# Patient Record
Sex: MALE | Race: WHITE | NOT HISPANIC OR LATINO | Employment: OTHER | URBAN - METROPOLITAN AREA
[De-identification: names, ages, dates, MRNs, and addresses within clinical notes are randomized per-mention and may not be internally consistent; named-entity substitution may affect disease eponyms.]

---

## 2024-09-05 ENCOUNTER — APPOINTMENT (OUTPATIENT)
Dept: RADIOLOGY | Facility: HOSPITAL | Age: 49
End: 2024-09-05
Payer: MEDICAID

## 2024-09-05 ENCOUNTER — APPOINTMENT (OUTPATIENT)
Dept: CARDIOLOGY | Facility: HOSPITAL | Age: 49
End: 2024-09-05
Payer: MEDICAID

## 2024-09-05 ENCOUNTER — HOSPITAL ENCOUNTER (OUTPATIENT)
Facility: HOSPITAL | Age: 49
Setting detail: OBSERVATION
Discharge: HOME | End: 2024-09-06
Attending: EMERGENCY MEDICINE | Admitting: STUDENT IN AN ORGANIZED HEALTH CARE EDUCATION/TRAINING PROGRAM
Payer: MEDICAID

## 2024-09-05 DIAGNOSIS — K21.00 GASTROESOPHAGEAL REFLUX DISEASE WITH ESOPHAGITIS, UNSPECIFIED WHETHER HEMORRHAGE: ICD-10-CM

## 2024-09-05 DIAGNOSIS — E78.49 OTHER HYPERLIPIDEMIA: ICD-10-CM

## 2024-09-05 DIAGNOSIS — F32.A DEPRESSION, UNSPECIFIED DEPRESSION TYPE: ICD-10-CM

## 2024-09-05 DIAGNOSIS — I15.9 SECONDARY HYPERTENSION: ICD-10-CM

## 2024-09-05 DIAGNOSIS — R07.9 CHEST PAIN, UNSPECIFIED TYPE: Primary | ICD-10-CM

## 2024-09-05 LAB
ALBUMIN SERPL BCP-MCNC: 5.1 G/DL (ref 3.4–5)
ALP SERPL-CCNC: 82 U/L (ref 33–120)
ALT SERPL W P-5'-P-CCNC: 12 U/L (ref 10–52)
AMPHETAMINES UR QL SCN: ABNORMAL
ANION GAP SERPL CALC-SCNC: 19 MMOL/L (ref 10–20)
AORTIC VALVE PEAK VELOCITY: 1.28 M/S
AST SERPL W P-5'-P-CCNC: 14 U/L (ref 9–39)
AV PEAK GRADIENT: 6.5 MMHG
AVA (PEAK VEL): 2.42 CM2
BARBITURATES UR QL SCN: ABNORMAL
BASOPHILS # BLD AUTO: 0.06 X10*3/UL (ref 0–0.1)
BASOPHILS NFR BLD AUTO: 0.6 %
BENZODIAZ UR QL SCN: ABNORMAL
BILIRUB SERPL-MCNC: 0.9 MG/DL (ref 0–1.2)
BNP SERPL-MCNC: 549 PG/ML (ref 0–99)
BUN SERPL-MCNC: 35 MG/DL (ref 6–23)
BZE UR QL SCN: ABNORMAL
CALCIUM SERPL-MCNC: 9.9 MG/DL (ref 8.6–10.6)
CANNABINOIDS UR QL SCN: ABNORMAL
CARDIAC TROPONIN I PNL SERPL HS: 40 NG/L (ref 0–53)
CARDIAC TROPONIN I PNL SERPL HS: 42 NG/L (ref 0–53)
CARDIAC TROPONIN I PNL SERPL HS: 45 NG/L (ref 0–53)
CHLORIDE SERPL-SCNC: 109 MMOL/L (ref 98–107)
CO2 SERPL-SCNC: 18 MMOL/L (ref 21–32)
CREAT SERPL-MCNC: 2.55 MG/DL (ref 0.5–1.3)
EGFRCR SERPLBLD CKD-EPI 2021: 30 ML/MIN/1.73M*2
EJECTION FRACTION APICAL 4 CHAMBER: 41.4
EJECTION FRACTION: 38 %
EOSINOPHIL # BLD AUTO: 0.01 X10*3/UL (ref 0–0.7)
EOSINOPHIL NFR BLD AUTO: 0.1 %
ERYTHROCYTE [DISTWIDTH] IN BLOOD BY AUTOMATED COUNT: 13 % (ref 11.5–14.5)
FENTANYL+NORFENTANYL UR QL SCN: ABNORMAL
GLUCOSE BLD MANUAL STRIP-MCNC: 83 MG/DL (ref 74–99)
GLUCOSE SERPL-MCNC: 83 MG/DL (ref 74–99)
HCT VFR BLD AUTO: 39.2 % (ref 41–52)
HGB BLD-MCNC: 13.3 G/DL (ref 13.5–17.5)
IMM GRANULOCYTES # BLD AUTO: 0.04 X10*3/UL (ref 0–0.7)
IMM GRANULOCYTES NFR BLD AUTO: 0.4 % (ref 0–0.9)
LEFT ATRIUM VOLUME AREA LENGTH INDEX BSA: 19.8 ML/M2
LEFT VENTRICLE INTERNAL DIMENSION DIASTOLE: 5.14 CM (ref 3.5–6)
LEFT VENTRICULAR OUTFLOW TRACT DIAMETER: 1.94 CM
LYMPHOCYTES # BLD AUTO: 1.55 X10*3/UL (ref 1.2–4.8)
LYMPHOCYTES NFR BLD AUTO: 14.3 %
MAGNESIUM SERPL-MCNC: 2.3 MG/DL (ref 1.6–2.4)
MCH RBC QN AUTO: 31.8 PG (ref 26–34)
MCHC RBC AUTO-ENTMCNC: 33.9 G/DL (ref 32–36)
MCV RBC AUTO: 94 FL (ref 80–100)
METHADONE UR QL SCN: ABNORMAL
MITRAL VALVE E/A RATIO: 0.61
MONOCYTES # BLD AUTO: 1.03 X10*3/UL (ref 0.1–1)
MONOCYTES NFR BLD AUTO: 9.5 %
NEUTROPHILS # BLD AUTO: 8.15 X10*3/UL (ref 1.2–7.7)
NEUTROPHILS NFR BLD AUTO: 75.1 %
NRBC BLD-RTO: 0 /100 WBCS (ref 0–0)
OPIATES UR QL SCN: ABNORMAL
OXYCODONE+OXYMORPHONE UR QL SCN: ABNORMAL
PCP UR QL SCN: ABNORMAL
PLATELET # BLD AUTO: 170 X10*3/UL (ref 150–450)
POTASSIUM SERPL-SCNC: 3.7 MMOL/L (ref 3.5–5.3)
PROT SERPL-MCNC: 8.6 G/DL (ref 6.4–8.2)
RBC # BLD AUTO: 4.18 X10*6/UL (ref 4.5–5.9)
RIGHT VENTRICLE FREE WALL PEAK S': 11 CM/S
SODIUM SERPL-SCNC: 142 MMOL/L (ref 136–145)
TRICUSPID ANNULAR PLANE SYSTOLIC EXCURSION: 2 CM
WBC # BLD AUTO: 10.8 X10*3/UL (ref 4.4–11.3)

## 2024-09-05 PROCEDURE — 96374 THER/PROPH/DIAG INJ IV PUSH: CPT

## 2024-09-05 PROCEDURE — 84484 ASSAY OF TROPONIN QUANT: CPT

## 2024-09-05 PROCEDURE — 93325 DOPPLER ECHO COLOR FLOW MAPG: CPT

## 2024-09-05 PROCEDURE — 93308 TTE F-UP OR LMTD: CPT | Performed by: STUDENT IN AN ORGANIZED HEALTH CARE EDUCATION/TRAINING PROGRAM

## 2024-09-05 PROCEDURE — 93321 DOPPLER ECHO F-UP/LMTD STD: CPT | Performed by: STUDENT IN AN ORGANIZED HEALTH CARE EDUCATION/TRAINING PROGRAM

## 2024-09-05 PROCEDURE — 83880 ASSAY OF NATRIURETIC PEPTIDE: CPT

## 2024-09-05 PROCEDURE — 2500000004 HC RX 250 GENERAL PHARMACY W/ HCPCS (ALT 636 FOR OP/ED)

## 2024-09-05 PROCEDURE — 1200000002 HC GENERAL ROOM WITH TELEMETRY DAILY

## 2024-09-05 PROCEDURE — 71046 X-RAY EXAM CHEST 2 VIEWS: CPT

## 2024-09-05 PROCEDURE — 96361 HYDRATE IV INFUSION ADD-ON: CPT

## 2024-09-05 PROCEDURE — 93010 ELECTROCARDIOGRAM REPORT: CPT | Performed by: EMERGENCY MEDICINE

## 2024-09-05 PROCEDURE — 96376 TX/PRO/DX INJ SAME DRUG ADON: CPT

## 2024-09-05 PROCEDURE — 2500000001 HC RX 250 WO HCPCS SELF ADMINISTERED DRUGS (ALT 637 FOR MEDICARE OP)

## 2024-09-05 PROCEDURE — 80053 COMPREHEN METABOLIC PANEL: CPT

## 2024-09-05 PROCEDURE — 99285 EMERGENCY DEPT VISIT HI MDM: CPT | Performed by: EMERGENCY MEDICINE

## 2024-09-05 PROCEDURE — 36415 COLL VENOUS BLD VENIPUNCTURE: CPT

## 2024-09-05 PROCEDURE — 93325 DOPPLER ECHO COLOR FLOW MAPG: CPT | Performed by: STUDENT IN AN ORGANIZED HEALTH CARE EDUCATION/TRAINING PROGRAM

## 2024-09-05 PROCEDURE — 2500000002 HC RX 250 W HCPCS SELF ADMINISTERED DRUGS (ALT 637 FOR MEDICARE OP, ALT 636 FOR OP/ED)

## 2024-09-05 PROCEDURE — 71046 X-RAY EXAM CHEST 2 VIEWS: CPT | Performed by: STUDENT IN AN ORGANIZED HEALTH CARE EDUCATION/TRAINING PROGRAM

## 2024-09-05 PROCEDURE — 82947 ASSAY GLUCOSE BLOOD QUANT: CPT

## 2024-09-05 PROCEDURE — 85025 COMPLETE CBC W/AUTO DIFF WBC: CPT

## 2024-09-05 PROCEDURE — 99223 1ST HOSP IP/OBS HIGH 75: CPT

## 2024-09-05 PROCEDURE — 2500000004 HC RX 250 GENERAL PHARMACY W/ HCPCS (ALT 636 FOR OP/ED): Performed by: STUDENT IN AN ORGANIZED HEALTH CARE EDUCATION/TRAINING PROGRAM

## 2024-09-05 PROCEDURE — G0378 HOSPITAL OBSERVATION PER HR: HCPCS

## 2024-09-05 PROCEDURE — 96375 TX/PRO/DX INJ NEW DRUG ADDON: CPT

## 2024-09-05 PROCEDURE — 80307 DRUG TEST PRSMV CHEM ANLYZR: CPT

## 2024-09-05 PROCEDURE — 99285 EMERGENCY DEPT VISIT HI MDM: CPT | Mod: 25

## 2024-09-05 PROCEDURE — 83735 ASSAY OF MAGNESIUM: CPT

## 2024-09-05 PROCEDURE — 93005 ELECTROCARDIOGRAM TRACING: CPT

## 2024-09-05 PROCEDURE — 84075 ASSAY ALKALINE PHOSPHATASE: CPT

## 2024-09-05 RX ORDER — ATORVASTATIN CALCIUM 80 MG/1
80 TABLET, FILM COATED ORAL NIGHTLY
Status: DISCONTINUED | OUTPATIENT
Start: 2024-09-05 | End: 2024-09-06 | Stop reason: HOSPADM

## 2024-09-05 RX ORDER — EZETIMIBE 10 MG/1
10 TABLET ORAL DAILY
Status: ON HOLD | COMMUNITY
End: 2024-09-06

## 2024-09-05 RX ORDER — LISINOPRIL 20 MG/1
10 TABLET ORAL DAILY
Status: ON HOLD | COMMUNITY
End: 2024-09-06

## 2024-09-05 RX ORDER — NITROGLYCERIN 0.4 MG/1
0.4 TABLET SUBLINGUAL ONCE
Status: COMPLETED | OUTPATIENT
Start: 2024-09-05 | End: 2024-09-05

## 2024-09-05 RX ORDER — MORPHINE SULFATE 4 MG/ML
4 INJECTION INTRAVENOUS ONCE
Status: DISCONTINUED | OUTPATIENT
Start: 2024-09-05 | End: 2024-09-05

## 2024-09-05 RX ORDER — PANTOPRAZOLE SODIUM 40 MG/1
40 TABLET, DELAYED RELEASE ORAL DAILY
Status: DISCONTINUED | OUTPATIENT
Start: 2024-09-05 | End: 2024-09-06 | Stop reason: HOSPADM

## 2024-09-05 RX ORDER — ARIPIPRAZOLE 5 MG/1
5 TABLET ORAL
Status: ON HOLD | COMMUNITY
Start: 2022-12-30 | End: 2024-09-06

## 2024-09-05 RX ORDER — HYDROXYZINE HYDROCHLORIDE 25 MG/1
25 TABLET, FILM COATED ORAL EVERY 6 HOURS PRN
Status: DISCONTINUED | OUTPATIENT
Start: 2024-09-05 | End: 2024-09-06 | Stop reason: HOSPADM

## 2024-09-05 RX ORDER — MORPHINE SULFATE 4 MG/ML
4 INJECTION INTRAVENOUS ONCE
Status: COMPLETED | OUTPATIENT
Start: 2024-09-05 | End: 2024-09-05

## 2024-09-05 RX ORDER — CLOPIDOGREL BISULFATE 75 MG/1
75 TABLET ORAL
Status: ON HOLD | COMMUNITY
Start: 2024-07-18 | End: 2024-09-06

## 2024-09-05 RX ORDER — KETOROLAC TROMETHAMINE 15 MG/ML
15 INJECTION, SOLUTION INTRAMUSCULAR; INTRAVENOUS ONCE
Status: COMPLETED | OUTPATIENT
Start: 2024-09-05 | End: 2024-09-05

## 2024-09-05 RX ORDER — CARVEDILOL 12.5 MG/1
1 TABLET ORAL
Status: ON HOLD | COMMUNITY
Start: 2024-07-18 | End: 2024-09-06

## 2024-09-05 RX ORDER — ISOSORBIDE MONONITRATE 120 MG/1
120 TABLET, EXTENDED RELEASE ORAL
Status: ON HOLD | COMMUNITY
Start: 2024-08-09 | End: 2024-09-05 | Stop reason: ENTERED-IN-ERROR

## 2024-09-05 RX ORDER — ARIPIPRAZOLE 5 MG/1
5 TABLET ORAL DAILY
Status: DISCONTINUED | OUTPATIENT
Start: 2024-09-05 | End: 2024-09-06 | Stop reason: HOSPADM

## 2024-09-05 RX ORDER — BUPROPION HYDROCHLORIDE 150 MG/1
300 TABLET ORAL DAILY
Status: DISCONTINUED | OUTPATIENT
Start: 2024-09-05 | End: 2024-09-06 | Stop reason: HOSPADM

## 2024-09-05 RX ORDER — MORPHINE SULFATE 4 MG/ML
1 INJECTION INTRAVENOUS ONCE
Status: COMPLETED | OUTPATIENT
Start: 2024-09-05 | End: 2024-09-05

## 2024-09-05 RX ORDER — POLYETHYLENE GLYCOL 3350 17 G/17G
17 POWDER, FOR SOLUTION ORAL DAILY PRN
Status: DISCONTINUED | OUTPATIENT
Start: 2024-09-05 | End: 2024-09-06 | Stop reason: HOSPADM

## 2024-09-05 RX ORDER — ACETAMINOPHEN 500 MG
1000 TABLET ORAL EVERY 8 HOURS PRN
Status: ON HOLD | COMMUNITY

## 2024-09-05 RX ORDER — ATORVASTATIN CALCIUM 80 MG/1
1 TABLET, FILM COATED ORAL NIGHTLY
Status: ON HOLD | COMMUNITY
Start: 2024-04-18 | End: 2024-09-06

## 2024-09-05 RX ORDER — EZETIMIBE 10 MG/1
10 TABLET ORAL DAILY
Status: DISCONTINUED | OUTPATIENT
Start: 2024-09-05 | End: 2024-09-06 | Stop reason: HOSPADM

## 2024-09-05 RX ORDER — BUPROPION HYDROCHLORIDE 150 MG/1
300 TABLET ORAL DAILY
Status: ON HOLD | COMMUNITY
End: 2024-09-06

## 2024-09-05 RX ORDER — ISOSORBIDE MONONITRATE 30 MG/1
90 TABLET, EXTENDED RELEASE ORAL DAILY
Status: DISCONTINUED | OUTPATIENT
Start: 2024-09-05 | End: 2024-09-06 | Stop reason: HOSPADM

## 2024-09-05 RX ORDER — PANTOPRAZOLE SODIUM 40 MG/1
40 TABLET, DELAYED RELEASE ORAL
Status: ON HOLD | COMMUNITY
Start: 2024-08-08 | End: 2024-09-06

## 2024-09-05 RX ORDER — HYDROXYZINE HYDROCHLORIDE 25 MG/1
1 TABLET, FILM COATED ORAL EVERY 6 HOURS PRN
Status: ON HOLD | COMMUNITY
Start: 2024-08-08

## 2024-09-05 RX ORDER — CARVEDILOL 12.5 MG/1
12.5 TABLET ORAL
Status: DISCONTINUED | OUTPATIENT
Start: 2024-09-05 | End: 2024-09-06 | Stop reason: HOSPADM

## 2024-09-05 RX ORDER — AMLODIPINE BESYLATE 5 MG/1
5 TABLET ORAL DAILY
Status: DISCONTINUED | OUTPATIENT
Start: 2024-09-05 | End: 2024-09-06

## 2024-09-05 RX ORDER — ACETAMINOPHEN 325 MG/1
650 TABLET ORAL EVERY 6 HOURS PRN
Status: DISCONTINUED | OUTPATIENT
Start: 2024-09-05 | End: 2024-09-06 | Stop reason: HOSPADM

## 2024-09-05 RX ORDER — ISOSORBIDE MONONITRATE 30 MG/1
90 TABLET, EXTENDED RELEASE ORAL
Status: ON HOLD | COMMUNITY
Start: 2024-08-15 | End: 2024-09-06

## 2024-09-05 RX ORDER — LISINOPRIL 10 MG/1
10 TABLET ORAL DAILY
Status: DISCONTINUED | OUTPATIENT
Start: 2024-09-05 | End: 2024-09-06 | Stop reason: HOSPADM

## 2024-09-05 RX ORDER — TOPIRAMATE 50 MG/1
50 TABLET, FILM COATED ORAL 2 TIMES DAILY
Status: ON HOLD | COMMUNITY
Start: 2024-08-15 | End: 2025-02-11

## 2024-09-05 RX ORDER — CLOPIDOGREL BISULFATE 75 MG/1
75 TABLET ORAL DAILY
Status: DISCONTINUED | OUTPATIENT
Start: 2024-09-05 | End: 2024-09-06 | Stop reason: HOSPADM

## 2024-09-05 SDOH — SOCIAL STABILITY: SOCIAL INSECURITY: ABUSE: ADULT

## 2024-09-05 SDOH — SOCIAL STABILITY: SOCIAL INSECURITY: DOES ANYONE TRY TO KEEP YOU FROM HAVING/CONTACTING OTHER FRIENDS OR DOING THINGS OUTSIDE YOUR HOME?: NO

## 2024-09-05 SDOH — SOCIAL STABILITY: SOCIAL INSECURITY: HAS ANYONE EVER THREATENED TO HURT YOUR FAMILY OR YOUR PETS?: NO

## 2024-09-05 SDOH — SOCIAL STABILITY: SOCIAL INSECURITY: ARE THERE ANY APPARENT SIGNS OF INJURIES/BEHAVIORS THAT COULD BE RELATED TO ABUSE/NEGLECT?: NO

## 2024-09-05 SDOH — SOCIAL STABILITY: SOCIAL INSECURITY: HAVE YOU HAD THOUGHTS OF HARMING ANYONE ELSE?: NO

## 2024-09-05 SDOH — SOCIAL STABILITY: SOCIAL INSECURITY: DO YOU FEEL UNSAFE GOING BACK TO THE PLACE WHERE YOU ARE LIVING?: NO

## 2024-09-05 SDOH — SOCIAL STABILITY: SOCIAL INSECURITY: WERE YOU ABLE TO COMPLETE ALL THE BEHAVIORAL HEALTH SCREENINGS?: YES

## 2024-09-05 SDOH — SOCIAL STABILITY: SOCIAL INSECURITY: ARE YOU OR HAVE YOU BEEN THREATENED OR ABUSED PHYSICALLY, EMOTIONALLY, OR SEXUALLY BY ANYONE?: NO

## 2024-09-05 SDOH — SOCIAL STABILITY: SOCIAL INSECURITY: HAVE YOU HAD ANY THOUGHTS OF HARMING ANYONE ELSE?: NO

## 2024-09-05 SDOH — SOCIAL STABILITY: SOCIAL INSECURITY: DO YOU FEEL ANYONE HAS EXPLOITED OR TAKEN ADVANTAGE OF YOU FINANCIALLY OR OF YOUR PERSONAL PROPERTY?: NO

## 2024-09-05 ASSESSMENT — COGNITIVE AND FUNCTIONAL STATUS - GENERAL
STANDING UP FROM CHAIR USING ARMS: A LITTLE
PERSONAL GROOMING: A LITTLE
DRESSING REGULAR LOWER BODY CLOTHING: A LITTLE
TOILETING: A LITTLE
MOBILITY SCORE: 18
PATIENT BASELINE BEDBOUND: NO
DRESSING REGULAR UPPER BODY CLOTHING: A LITTLE
HELP NEEDED FOR BATHING: A LITTLE
WALKING IN HOSPITAL ROOM: A LITTLE
CLIMB 3 TO 5 STEPS WITH RAILING: TOTAL
DAILY ACTIVITIY SCORE: 19
MOVING TO AND FROM BED TO CHAIR: A LITTLE

## 2024-09-05 ASSESSMENT — ACTIVITIES OF DAILY LIVING (ADL)
LACK_OF_TRANSPORTATION: NO
JUDGMENT_ADEQUATE_SAFELY_COMPLETE_DAILY_ACTIVITIES: YES
ASSISTIVE_DEVICE: PROSTHESIS
TOILETING: NEEDS ASSISTANCE
HEARING - LEFT EAR: FUNCTIONAL
PATIENT'S MEMORY ADEQUATE TO SAFELY COMPLETE DAILY ACTIVITIES?: YES
FEEDING YOURSELF: INDEPENDENT
BATHING: NEEDS ASSISTANCE
GROOMING: NEEDS ASSISTANCE
DRESSING YOURSELF: NEEDS ASSISTANCE
ADEQUATE_TO_COMPLETE_ADL: YES
WALKS IN HOME: INDEPENDENT
HEARING - RIGHT EAR: FUNCTIONAL

## 2024-09-05 ASSESSMENT — PAIN SCALES - GENERAL
PAINLEVEL_OUTOF10: 10 - WORST POSSIBLE PAIN
PAINLEVEL_OUTOF10: 9
PAINLEVEL_OUTOF10: 0 - NO PAIN

## 2024-09-05 ASSESSMENT — LIFESTYLE VARIABLES
AUDIT-C TOTAL SCORE: 0
HOW OFTEN DO YOU HAVE A DRINK CONTAINING ALCOHOL: NEVER
SUBSTANCE_ABUSE_PAST_12_MONTHS: NO
HOW MANY STANDARD DRINKS CONTAINING ALCOHOL DO YOU HAVE ON A TYPICAL DAY: PATIENT DOES NOT DRINK
HOW OFTEN DO YOU HAVE 6 OR MORE DRINKS ON ONE OCCASION: NEVER
SKIP TO QUESTIONS 9-10: 1
PRESCIPTION_ABUSE_PAST_12_MONTHS: NO
AUDIT-C TOTAL SCORE: 0

## 2024-09-05 ASSESSMENT — COLUMBIA-SUICIDE SEVERITY RATING SCALE - C-SSRS
1. IN THE PAST MONTH, HAVE YOU WISHED YOU WERE DEAD OR WISHED YOU COULD GO TO SLEEP AND NOT WAKE UP?: NO
2. HAVE YOU ACTUALLY HAD ANY THOUGHTS OF KILLING YOURSELF?: NO
6. HAVE YOU EVER DONE ANYTHING, STARTED TO DO ANYTHING, OR PREPARED TO DO ANYTHING TO END YOUR LIFE?: NO

## 2024-09-05 ASSESSMENT — PAIN DESCRIPTION - LOCATION: LOCATION: CHEST

## 2024-09-05 ASSESSMENT — PAIN - FUNCTIONAL ASSESSMENT: PAIN_FUNCTIONAL_ASSESSMENT: 0-10

## 2024-09-05 ASSESSMENT — PATIENT HEALTH QUESTIONNAIRE - PHQ9
1. LITTLE INTEREST OR PLEASURE IN DOING THINGS: NOT AT ALL
SUM OF ALL RESPONSES TO PHQ9 QUESTIONS 1 & 2: 0
2. FEELING DOWN, DEPRESSED OR HOPELESS: NOT AT ALL

## 2024-09-05 NOTE — ED TRIAGE NOTES
Pt states he was walking after verbal altercation with father when he developed Left sided non radiating chest pain, worse with breathing. Hx stents placed April. Aox4

## 2024-09-05 NOTE — NURSING NOTE
Pt would like to eat before getting medications.    14:19:  Pt denied CP at this time.     19:28:  Pt has asked for this RN to ask for Morphine for CP.  Liv Mars notified.

## 2024-09-05 NOTE — ED PROVIDER NOTES
Emergency Department Provider Note        History of Present Illness     History provided by: Patient  Limitations to History: None    HPI:  Patient is a 48-year-old male with a past medical history of coronary artery disease status post stent placement, NSTEMI, ischemic cardiomyopathy, CVA, CKD, PAD status post left BKA DVT, diabetes, hypertension presenting to the ED for chest pain.  Patient states he was in argument with his father when he started having chest pain when he was walking home.  Patient states that he sometimes has chest pain with exertion however that usually subsides with rest.  Patient states while resting he still exhibited the chest pain.  Patient states it is a sharp chest pain in his left side of the chest with radiation to the left shoulder and around the rib area.  Patient states it does not go through the back.  Patient states he is compliant with his Eliquis and is denying any pain in the lower extremity.  Patient denying any abdominal pain and states mild shortness of breath and dizziness.  Patient states he has a history of cocaine use however has not used cocaine in the past month.  Patient states he smokes half a pack a day of cigarettes.  Physical Exam   Triage vitals:  T 36.1 °C (96.9 °F)  HR 82  /77  RR 16  O2 96 % None (Room air)    Physical Exam  Constitutional:       Appearance: Normal appearance.   HENT:      Head: Normocephalic.   Eyes:      Extraocular Movements: Extraocular movements intact.   Cardiovascular:      Rate and Rhythm: Regular rhythm. Tachycardia present.      Heart sounds: Normal heart sounds.   Pulmonary:      Effort: Pulmonary effort is normal.      Breath sounds: Normal breath sounds.   Chest:      Chest wall: No tenderness.   Abdominal:      General: Abdomen is flat.      Palpations: Abdomen is soft.   Musculoskeletal:         General: Normal range of motion.      Cervical back: Normal range of motion.   Skin:     General: Skin is warm.    Neurological:      Mental Status: He is alert. Mental status is at baseline.   Psychiatric:         Mood and Affect: Mood normal.         Behavior: Behavior normal.          Medical Decision Making & ED Course   Medical Decision Making:  Patient is a 48-year-old to the ED with chest pain.  Patient states typically he has chest pain with exertion however states that today as he rested he still exhibited the chest pain.  Patient has a significant cardiac history in which she has had stents placed in the past and has stated that he has talked to his cardiologist about eventually needed a CABG.  Patient had a nuclear stress test in May of this year which showed calcifications but no ischemia.  Patient had a Echo that showed concerns for a possible pericardial effusion.  Patient states he was given colchicine for this pericardial effusion.  Patient had a left heart catheterization in January of this year which showed a patent stent in the left main, LAD, LCx point-of-care ultrasound was done at bedside that showed a mild pericardial effusion, normal EF, flattening IVC.  Patient was given a liter of fluid due to IVC findings.  Differential includes ACS, aortic dissection, pulmonary embolism, pneumonia.  Patient states he is compliant with his Eliquis and therefore there is a lower concern for pulmonary embolism.  Patient denying any cough or congestion and lung sounds are clear therefore there is a lower concern for pneumonia however we will be following up with a chest x-ray.  Patient states he has had this pain in the past and is denying any radiation to his back.  Therefore there is a lower concern of aortic dissection.  However point-of-care ultrasound was negative for dissection as well as AAA.  Patient's pain with exertion is more consistent with stable Angina and as patient states that with rest.  Patient troponin within normal limits, BNP elevated at 549 however no signs of fluid overload.  Patient magnesium  2.30 patient was seen to have an elevated creatinine 2.55 which seems to be his baseline.  Chest x-ray shows no pulmonary edema, pleural effusion, pneumonia.  Patient was given 2 tablets sublingual nitro and morphine for pain.  Patient remains to have chest pain.  Due to chest pain, cardiac history, need for sublingual nitro and heart score of 4 patient was admitted.  ----  Scoring Tools Utilized:            EKG Independent Interpretation:  Sinus tachycardia, heart rate 103, QTc 497, patient had T wave inversion in aVL there is no EKG in the system to compare.        The patient was discussed with the following consultants/services: Admission Coordinator who accepted the patient for admission           Disposition   As a result of their workup, the patient will require admission to the hospital.  The patient was informed of his diagnosis.  The patient was given the opportunity to ask questions and I answered them. The patient agreed to be admitted to the hospital.    Procedures   Procedures    Patient seen and discussed with ED attending physician.    Edel Tiwari MD  Emergency Medicine       Edel Tiwari MD  Resident  09/05/24 0686     DISPLAY PLAN FREE TEXT

## 2024-09-05 NOTE — H&P
History Of Present Illness  Alex Hall is a 48 year old male with a history CAD s/p FADY (May and July 2024), post MI pericarditis with pericardial effusion (s/p 3 months of colchicine) , DVT (Eliquis), CVA with residual left-sided weakness, PAD s/p L BKA, CKD, T2DM, polysubstance use presenting with chest pain. He has history of CAD and recent FADY to LM-LAD and LCx in 05/2024 done at [facility]. He had another LHC in 07/2024 which showed patent stents but 99% occluded septal with collateral from RPDA.  Patient states that he was upset with his father at home so decided to walk to [facility] shelter to spend the night when he experienced severe chest pain that caused him to fall to the ground landing on his hands. He states that this pain originally began in april prior to his stenting and has not gone away. He describes the pain as a pressure that radiates to his left rib, and jaws bilaterally.   On arrival to the ED patient was hemodynamically stable was given 2 sublingual nitros and morphine and states the pain has gotten better however still remains.   Patient had recent admission at [facility] from 8/3-8/8 for chest pain 2/2    Cardiology team felt that troponin elevation is secondary to vasospasm in the setting of cocaine use. Started on Welbutrin for opiod seeking behavior    ED Course   Heart Rate 105 Resp 21 BP, 162/103  Trop 42-->45    Euvolemic on exam     Interventions   2 Sublingual nitros  IV morphine   EKG Sinus tachycardia, heart rate 103, QTc 497, patient had T wave inversion in aVL there is no EKG in the system to compare   CXR NAD      Past Medical History  He has a past medical history of Stroke (cerebrum) (Multi) (2017).    Surgical History  He has a past surgical history that includes Leg amputation (Left) and Hip surgery (Left).     Social History  Patient is a  0.5 pack per day smoker since 12 y/o   He does not use alcohol   Patient had recent admission related to cocaine use  but states he is no longer using.   Family History  Mother with CABG no longer living  Grandfather with pacemaker      Allergies  Duloxetine, Voltaren [diclofenac sodium], Nsaids (non-steroidal anti-inflammatory drug), and Varenicline    Review of Systems     Physical Exam  Cardiovascular:      Rate and Rhythm: Normal rate and regular rhythm.      Heart sounds: No murmur heard.     No friction rub. No gallop.   Pulmonary:      Effort: No respiratory distress.      Breath sounds: Normal breath sounds. No wheezing.   Musculoskeletal:      Right lower leg: No edema.      Comments: Left knee BKA     Skin:     General: Skin is warm and dry.          Last Recorded Vitals  /90 (BP Location: Left arm, Patient Position: Lying)   Pulse 87   Temp 37.3 °C (99.1 °F) (Temporal)   Resp 18   Wt 70.3 kg (155 lb)   SpO2 97%     Relevant Results  CXR 9/5   IMPRESSION:  1.  No evidence of acute cardiopulmonary process.    Echocardiogram:   6/23/24    Impression     CONCLUSIONS:  - Limited echo for pericardial effusion.    - There is trivial pericardial effusion.  - The left ventricle is normal in size. Left ventricular systolic function is  mildly decreased. EF = 52 ± 5% (2D biplane)  - The right ventricle is normal in size. Right ventricular systolic function is  normal.         Nuclear stress test done 5/15/24    CONCLUSIONS:    1. SPECT Perfusion Study: Normal.    2. There is no scintigraphic evidence for inducible ischemia.    3. No evidence of scarred myocardium.    4. Left ventricle is moderately dilated. The left ventricle systolic   function is moderately decreased.    5. Right ventricle is normal in size. The right ventricle systolic   function is normal.    6. This is an intermediate risk scan due to calculated LVEF.         Assessment/Plan   Assessment & Plan  Chest pain, unspecified type      48 year old male with a history CAD s/p FADY (May and July 2024) c/b pericarditis with pericardial effusion (on colchicine)  , DVT (Eliquis), CVA with residual left-sided weakness, PAD s/p BKA, CKD, T2DM, polysubstance presenting with chest pain. Patient had negative troponin, EKG showing sinus tachycardia, BNP of 549 appears Euvolemic on exam but had urine tox positive for cocaine. Given course so far patient's chest pain is likely due to coronary vasospasm in the setting of cocaine use.      #Cocaine related Chest pain     #Coronary artery disease involving native coronary artery of native heart without angina pectoris  :: Troponin  4AM 42 -->45 5AM   ::    :: LDL31 N , Triglycerides 63  :: A1C 6.2  ::TSH T4 N   :: he had LHC in 05/2024 and 07/2024. He had FADY to LM-LAD and LCx in 05/2024 at [facility]. He again had another LHC in 07/2024 which confirmed patent stents but showed 99% occluded Septal with collaterals from RPDA   --continue Plavix 75mg for drug-eluting stents   --continue Imdur, dose increased 8/5  --continue home statin and Zetia  --continue home Coreg 12.5 , Lisinopril 10, Jardiance 10     #Cocaine use  #Opioid use disorder  --recurrent admissions for cocaine use causing elevated troponins  --addiction medicine recs appreciated. Started on wellbutrin and suboxone during last admission. States that he            # pericardial effusion  :: Pericardial effusion noted on ED bedside POCUS   - Pending TTE   --Left Ventricular Ejection Fraction: 55 % on 08/05/2024.  --repeat echo with resolution of effusion  --continue home colchicine     #History of CVA (cerebrovascular accident)  #History of hemorrhagic stroke with residual hemiparesis (HCC)  --chronic. stable.   --continue home Plavix and statin      #PAD (peripheral artery disease) ([facility])  #Hx of BKA, left (HCC)  --chronic. stable.   --continue home Plavix and statin      #Hyperlipidemia  --chronic. stable. continue home statin and Zetia      #History of venous thromboembolism  --chronic. stable. continue home Eliquis     #Type 2 diabetes mellitus   HbA1c 6.2  on 9/5/24  --continue home empagliflozin, with SSI while inpatient     #Stage 3b chronic kidney disease (HCC)  --chronic. stable.   --Avoid nephrotoxins, contrast if possible  --Renally dose medications           Jazmyn Kirk MD  PGY-1 Internal Medicine Resident

## 2024-09-05 NOTE — PROGRESS NOTES
Pharmacy Medication History Review    Alex Hall is a 48 y.o. male admitted for Chest pain, unspecified type. Pharmacy reviewed the patient's atsoo-og-tfcnjyeht medications and allergies for accuracy.    Medications ADDED:  Bupropion  mg  Lisinopril 20 mg  Acetaminophen 500 mg  Medications CHANGED:  None   Medications REMOVED:   None      The list below reflects the updated PTA list. Comments regarding how patient may be taking medications differently can be found in the Admit Orders Activity  Prior to Admission Medications   Prescriptions Last Dose Informant   ARIPiprazole (Abilify) 5 mg tablet 9/4/2024 Self   Sig: Take 1 tablet (5 mg) by mouth once daily.   acetaminophen (Tylenol) 500 mg tablet 9/4/2024 Self   Sig: Take 2 tablets (1,000 mg) by mouth every 8 hours if needed for mild pain (1 - 3).   apixaban (Eliquis) 5 mg tablet 9/4/2024 Self   Sig: Take 1 tablet (5 mg) by mouth twice a day.   atorvastatin (Lipitor) 80 mg tablet 9/4/2024 Self   Sig: Take 1 tablet (80 mg) by mouth once daily at bedtime.   buPROPion XL (Wellbutrin XL) 150 mg 24 hr tablet 9/4/2024 Self   Sig: Take 2 tablets (300 mg) by mouth once daily. Do not crush, chew, or split.   carvedilol (Coreg) 12.5 mg tablet 9/4/2024 Self   Sig: Take 1 tablet (12.5 mg) by mouth 2 times daily (morning and late afternoon).   clopidogrel (Plavix) 75 mg tablet 9/4/2024 Self   Sig: Take 1 tablet (75 mg) by mouth once daily.   empagliflozin (Jardiance) 10 mg 9/4/2024 Self   Sig: Take 1 tablet (10 mg) by mouth once daily.   ezetimibe (Zetia) 10 mg tablet  Self   Sig: Take 1 tablet (10 mg) by mouth once daily.   hydrOXYzine HCL (Atarax) 25 mg tablet 9/4/2024 Self   Sig: Take 1 tablet (25 mg) by mouth every 6 hours if needed.   isosorbide mononitrate ER (Imdur) 30 mg 24 hr tablet 9/4/2024 Self   Sig: Take 3 tablets (90 mg) by mouth once daily.   lisinopril 20 mg tablet 9/4/2024 Self   Sig: Take 1 tablet (20 mg) by mouth once daily.   pantoprazole (ProtoNix)  "40 mg EC tablet 9/4/2024 Self   Sig: Take 1 tablet (40 mg) by mouth once daily.   topiramate (Topamax) 50 mg tablet 9/4/2024 Self   Sig: Take 1 tablet (50 mg) by mouth twice a day.      Facility-Administered Medications: None        The list below reflects the updated allergy list. Please review each documented allergy for additional clarification and justification.  Allergies  Reviewed by Isela Agosto on 9/5/2024        Severity Reactions Comments    Duloxetine Not Specified Insomnia     Voltaren [diclofenac Sodium] Not Specified Hives     Nsaids (non-steroidal Anti-inflammatory Drug) Low Rash Post CVA    Varenicline Low Anxiety, Other, Hives, Unknown \"It makes me go loco\"            Patient accepts M2B at discharge. Pharmacy has been updated to [facility].    Sources used to complete the med history include:  OARRS  Interview with Patient (Good Historian)  Care Everywhere reviewed  Outpatient fill history  Called [facility] to verify fill history    Below are additional concerns with the patient's PTA list:  Patient stated VOLTAREN gel broke him out into hives, was added to patient allergy list.    Isela Agosto [facility]  Transitions of Care Technician  [facility]  Please reach out via Secure Chat for questions, or if no response call Pencil You In or vocera Med[facility]   "

## 2024-09-05 NOTE — PROGRESS NOTES
09/05/24 0933   Discharge Planning   Living Arrangements Parent   Support Systems Parent   Assistance Needed none   Type of Residence Private residence   Number of Stairs to Enter Residence 4   Number of Stairs Within Residence 0   Do you have animals or pets at home? Yes   Type of Animals or Pets dog   Who is requesting discharge planning? Provider   Home or Post Acute Services None   Type of Home Care Services Other (Comment)   Expected Discharge Disposition Home   Does the patient need discharge transport arranged? No   Financial Resource Strain   How hard is it for you to pay for the very basics like food, housing, medical care, and heating? Not very   Housing Stability   In the last 12 months, was there a time when you were not able to pay the mortgage or rent on time? N   In the past 12 months, how many times have you moved where you were living? 1   At any time in the past 12 months, were you homeless or living in a shelter (including now)? N   Transportation Needs   In the past 12 months, has lack of transportation kept you from medical appointments or from getting medications? no   In the past 12 months, has lack of transportation kept you from meetings, work, or from getting things needed for daily living? No   Patient Choice   Provider Choice list and CMS website (https://medicare.gov/care-compare#search) for post-acute Quality and Resource Measure Data were provided and reviewed with: Patient   Patient / Family choosing to utilize agency / facility established prior to hospitalization No     Transitional Care Coordinator Note:  9/5/2024@9:00 Met with patient to introduce myself, role and discuss discharge planning s/p admission.  Patient lives with parents  Demographics and contact information confirmed.  Will continue to monitor patient for all home going needs.  Garrett Bailey RN [facility] [phone]              Previous Home Care  None   Falls: none   O2/Cpap-None   Dialysis None   Transportation at  discharge- Has transportation

## 2024-09-06 ENCOUNTER — PHARMACY VISIT (OUTPATIENT)
Dept: PHARMACY | Facility: CLINIC | Age: 49
End: 2024-09-06
Payer: MEDICAID

## 2024-09-06 ENCOUNTER — DOCUMENTATION (OUTPATIENT)
Dept: INPATIENT UNIT | Facility: HOSPITAL | Age: 49
End: 2024-09-06
Payer: MEDICAID

## 2024-09-06 VITALS
HEIGHT: 70 IN | TEMPERATURE: 97.5 F | OXYGEN SATURATION: 99 % | BODY MASS INDEX: 20.83 KG/M2 | WEIGHT: 145.5 LBS | SYSTOLIC BLOOD PRESSURE: 117 MMHG | HEART RATE: 81 BPM | RESPIRATION RATE: 18 BRPM | DIASTOLIC BLOOD PRESSURE: 71 MMHG

## 2024-09-06 PROBLEM — I10 HYPERTENSION: Status: ACTIVE | Noted: 2024-09-06

## 2024-09-06 PROBLEM — F32.A DEPRESSION: Status: ACTIVE | Noted: 2024-09-06

## 2024-09-06 PROBLEM — K21.9 GERD (GASTROESOPHAGEAL REFLUX DISEASE): Status: ACTIVE | Noted: 2024-09-06

## 2024-09-06 PROBLEM — E78.49 OTHER HYPERLIPIDEMIA: Status: ACTIVE | Noted: 2024-09-06

## 2024-09-06 LAB — GLUCOSE BLD MANUAL STRIP-MCNC: 101 MG/DL (ref 74–99)

## 2024-09-06 PROCEDURE — RXMED WILLOW AMBULATORY MEDICATION CHARGE

## 2024-09-06 PROCEDURE — 2500000002 HC RX 250 W HCPCS SELF ADMINISTERED DRUGS (ALT 637 FOR MEDICARE OP, ALT 636 FOR OP/ED)

## 2024-09-06 PROCEDURE — 82947 ASSAY GLUCOSE BLOOD QUANT: CPT

## 2024-09-06 PROCEDURE — 2500000001 HC RX 250 WO HCPCS SELF ADMINISTERED DRUGS (ALT 637 FOR MEDICARE OP)

## 2024-09-06 PROCEDURE — 99239 HOSP IP/OBS DSCHRG MGMT >30: CPT

## 2024-09-06 PROCEDURE — G0378 HOSPITAL OBSERVATION PER HR: HCPCS

## 2024-09-06 RX ORDER — LISINOPRIL 10 MG/1
10 TABLET ORAL DAILY
Qty: 360 TABLET | Refills: 0 | Status: ON HOLD | OUTPATIENT
Start: 2024-09-06 | End: 2025-09-06

## 2024-09-06 RX ORDER — LISINOPRIL 20 MG/1
10 TABLET ORAL DAILY
Qty: 15 TABLET | Refills: 11 | Status: SHIPPED | OUTPATIENT
Start: 2024-09-06 | End: 2024-09-06

## 2024-09-06 RX ORDER — CLOPIDOGREL BISULFATE 75 MG/1
75 TABLET ORAL
Qty: 30 TABLET | Refills: 11 | Status: ON HOLD | OUTPATIENT
Start: 2024-09-06 | End: 2025-09-06

## 2024-09-06 RX ORDER — BUPROPION HYDROCHLORIDE 150 MG/1
300 TABLET ORAL DAILY
Qty: 60 TABLET | Refills: 11 | Status: ON HOLD | OUTPATIENT
Start: 2024-09-06 | End: 2025-09-06

## 2024-09-06 RX ORDER — AMLODIPINE BESYLATE 5 MG/1
5 TABLET ORAL DAILY
Qty: 30 TABLET | Refills: 11 | Status: SHIPPED | OUTPATIENT
Start: 2024-09-06 | End: 2024-09-06

## 2024-09-06 RX ORDER — ISOSORBIDE MONONITRATE 30 MG/1
90 TABLET, EXTENDED RELEASE ORAL
Qty: 90 TABLET | Refills: 11 | Status: ON HOLD | OUTPATIENT
Start: 2024-09-06 | End: 2025-09-06

## 2024-09-06 RX ORDER — CARVEDILOL 12.5 MG/1
12.5 TABLET ORAL
Qty: 60 TABLET | Refills: 11 | Status: ON HOLD | OUTPATIENT
Start: 2024-09-06 | End: 2025-09-06

## 2024-09-06 RX ORDER — ATORVASTATIN CALCIUM 80 MG/1
80 TABLET, FILM COATED ORAL NIGHTLY
Qty: 30 TABLET | Refills: 11 | Status: ON HOLD | OUTPATIENT
Start: 2024-09-06 | End: 2025-09-06

## 2024-09-06 RX ORDER — AMLODIPINE BESYLATE 2.5 MG/1
2.5 TABLET ORAL DAILY
Status: DISCONTINUED | OUTPATIENT
Start: 2024-09-06 | End: 2024-09-06 | Stop reason: HOSPADM

## 2024-09-06 RX ORDER — ARIPIPRAZOLE 5 MG/1
5 TABLET ORAL DAILY
Qty: 30 TABLET | Refills: 11 | Status: ON HOLD | OUTPATIENT
Start: 2024-09-06 | End: 2025-09-06

## 2024-09-06 RX ORDER — PANTOPRAZOLE SODIUM 40 MG/1
40 TABLET, DELAYED RELEASE ORAL
Qty: 30 TABLET | Refills: 11 | Status: ON HOLD | OUTPATIENT
Start: 2024-09-06 | End: 2025-09-06

## 2024-09-06 RX ORDER — AMLODIPINE BESYLATE 5 MG/1
2.5 TABLET ORAL DAILY
Qty: 15 TABLET | Refills: 11 | Status: ON HOLD | OUTPATIENT
Start: 2024-09-06 | End: 2025-09-06

## 2024-09-06 RX ORDER — CARVEDILOL 12.5 MG/1
12.5 TABLET ORAL
Qty: 60 TABLET | Refills: 11 | Status: CANCELLED | OUTPATIENT
Start: 2024-09-06 | End: 2025-09-06

## 2024-09-06 RX ORDER — LISINOPRIL 10 MG/1
10 TABLET ORAL 2 TIMES DAILY
Qty: 60 TABLET | Refills: 11 | Status: SHIPPED | OUTPATIENT
Start: 2024-09-06 | End: 2024-09-06

## 2024-09-06 RX ORDER — NITROGLYCERIN 0.4 MG/1
0.4 TABLET SUBLINGUAL EVERY 5 MIN PRN
Qty: 100 TABLET | Refills: 1 | Status: ON HOLD | OUTPATIENT
Start: 2024-09-06 | End: 2024-10-06

## 2024-09-06 RX ORDER — EZETIMIBE 10 MG/1
10 TABLET ORAL DAILY
Qty: 30 TABLET | Refills: 11 | Status: ON HOLD | OUTPATIENT
Start: 2024-09-06 | End: 2025-09-06

## 2024-09-06 RX ORDER — AMLODIPINE BESYLATE 2.5 MG/1
2.5 TABLET ORAL DAILY
Qty: 360 TABLET | Refills: 0 | Status: ON HOLD | OUTPATIENT
Start: 2024-09-07 | End: 2025-09-07

## 2024-09-06 ASSESSMENT — COGNITIVE AND FUNCTIONAL STATUS - GENERAL
STANDING UP FROM CHAIR USING ARMS: A LITTLE
TURNING FROM BACK TO SIDE WHILE IN FLAT BAD: A LITTLE
DAILY ACTIVITIY SCORE: 20
DRESSING REGULAR UPPER BODY CLOTHING: A LITTLE
TOILETING: A LITTLE
CLIMB 3 TO 5 STEPS WITH RAILING: A LOT
DRESSING REGULAR LOWER BODY CLOTHING: A LITTLE
WALKING IN HOSPITAL ROOM: A LITTLE
MOVING TO AND FROM BED TO CHAIR: A LITTLE
HELP NEEDED FOR BATHING: A LITTLE
MOBILITY SCORE: 18

## 2024-09-06 ASSESSMENT — PAIN SCALES - GENERAL: PAINLEVEL_OUTOF10: 10 - WORST POSSIBLE PAIN

## 2024-09-06 NOTE — CARE PLAN
The patient's goals for the shift include Relieve CP    The clinical goals for the shift include Admission, POC    Over the shift, the patient did make progress toward the following goals.

## 2024-09-06 NOTE — NURSING NOTE
Pt refused to wait for his medication that was being sent from pharmacy.  Pt discharge without his meds to bed delivery.     12:00 - Unit manager stated this Pt did get his medications from Meds to Bed.

## 2024-09-06 NOTE — HOSPITAL COURSE
Alex Hall is a 48 year old male with a history CAD s/p FADY (May and July 2024), post MI pericarditis with pericardial effusion (s/p 3 months of colchicine) , DVT (Eliquis), CVA with residual left-sided weakness, PAD s/p L BKA, CKD, T2DM, polysubstance use presenting with chest pain. He has history of CAD and recent FADY to LM-LAD and LCx in 05/2024 done at [facility]. He had another LHC in 07/2024 which showed patent stents but 99% occluded septal with collateral from RPDA.  Patient reportedly had verbal confrontation with his father, and decided to walk to the shelter to spend the night. During his walk he experienced severe CP had a mechanical fall w/o syncope, confusion or head trauma. He described pain as a pressure radiating to his left shoulder and jaws bilaterally.In the ED patient was tachycardic, with a /103 and was given 2 sublingual nitros and morphine which improved his pain. Troponin's were negative, CXR was negative, EKG showed sinus tachycardia, and BNP was elevated at 549 but patient was Euvolemic on exam.   9/5 Patient was  admitted to the medicine unit and started on home GDMT,and given 1mg of morphine as he had continued to have pain. Patient was without, any electrolyte derangements and urine tox was ordered which was positive for cocaine. He was started on amlodipine 5 mg and had reduced pain. 9/6  overnight patient had increased pain, EKG was ordered which revealed NSR, and bedside exam revealed that there may be a musculoskeletal component to the pain so he was given 15 mg of tramadol. Patient remain HDS and received TTE later that morning which showed  borderline to moderately reduced ejection fraction. He was counseled on smoking cessation and discontinuing use of chemical substances. Patient will be referred for Cardiology follow up with Dr. Gentile and PCP follow up. He will have repeat echo Vs MRI in 3 months. There were no barriers to discharge at this time and he was  discharged with all home meds including new amlodipine medications as there were concerns with him obtaining them as he is on disability with unstable housing.

## 2024-09-06 NOTE — DISCHARGE INSTRUCTIONS
Madhavi Hall,  You presented here with Chest pain out of concern for a heart attack. You had an EKG done which was normal, and you also had heart enzymes measured called troponin which did not come back elevated. An analysis of your urine came back positive for cocaine which is likely what caused your chest pain. You were started on a medication called amlodipine at 2.5 mg which can help the chest pain related to cocaine use. On discharge you were set up with all of your home medications, in addition to the amlodipine discussed above and sublingual nitroglycerin when you have chest pain. You also had an ultrasound of your heart that showed a slight decrease in the squeezing of your heart, we will have you repeat the imaging in 3 months to assess your heart function. You have also been set up for a referral to 's Cardiology clinic, and will have referral placed for PCP.  You were also counseled on smoking cessation, and seeking further help for chemical substance use.  It was a pleasure having you at our hospital.    Best,  Dr. Jazmyn Kirk

## 2024-09-06 NOTE — SIGNIFICANT EVENT
Notified by nursing staff that the patient is complaining of chest pain.  ECG obtained around 8 PM revealed normal sinus rhythm with no ST or T wave changes.  Narrow QRS and calculated QTc around 530 MS.  At bedside, the patient is complaining of midsternal pressure-like chest pain that is 7 out of 10 which has been intermittent throughout today.  States that pain radiates to his left-sided ribs.  States that the pain is worse with deep breaths and is splinting his breaths.  States that nitro previously did not improve this pain.    Noted eGFR 30 - plan for 15 mg toradol trial given suspicion for MSK component. Low concern for ischemic chest pain given splinted breathing, lack of relation to exertion, lack of ECG changes.    Exam:  Resting comfortably  No m/r/g  No resp distress  Minimal tenderness to palpation along sternum  No RLE edema

## 2024-09-07 ENCOUNTER — APPOINTMENT (OUTPATIENT)
Dept: RADIOLOGY | Facility: HOSPITAL | Age: 49
End: 2024-09-07
Payer: MEDICAID

## 2024-09-07 ENCOUNTER — HOSPITAL ENCOUNTER (INPATIENT)
Facility: HOSPITAL | Age: 49
End: 2024-09-07
Attending: EMERGENCY MEDICINE | Admitting: STUDENT IN AN ORGANIZED HEALTH CARE EDUCATION/TRAINING PROGRAM
Payer: MEDICAID

## 2024-09-07 DIAGNOSIS — R07.9 CHEST PAIN, UNSPECIFIED TYPE: Primary | ICD-10-CM

## 2024-09-07 DIAGNOSIS — T87.9 BKA STUMP COMPLICATION (MULTI): ICD-10-CM

## 2024-09-07 LAB
ALBUMIN SERPL BCP-MCNC: 4.2 G/DL (ref 3.4–5)
ALP SERPL-CCNC: 73 U/L (ref 33–120)
ALT SERPL W P-5'-P-CCNC: 8 U/L (ref 10–52)
AMPHETAMINES UR QL SCN: ABNORMAL
ANION GAP BLDV CALCULATED.4IONS-SCNC: 14 MMOL/L (ref 10–25)
ANION GAP SERPL CALC-SCNC: 13 MMOL/L (ref 10–20)
APTT PPP: 30 SECONDS (ref 27–38)
AST SERPL W P-5'-P-CCNC: 10 U/L (ref 9–39)
ATRIAL RATE: 95 BPM
BARBITURATES UR QL SCN: ABNORMAL
BASE EXCESS BLDV CALC-SCNC: -5.9 MMOL/L (ref -2–3)
BASOPHILS # BLD AUTO: 0.07 X10*3/UL (ref 0–0.1)
BASOPHILS NFR BLD AUTO: 0.7 %
BENZODIAZ UR QL SCN: ABNORMAL
BILIRUB SERPL-MCNC: 0.4 MG/DL (ref 0–1.2)
BNP SERPL-MCNC: 80 PG/ML (ref 0–99)
BODY TEMPERATURE: 37 DEGREES CELSIUS
BUN SERPL-MCNC: 39 MG/DL (ref 6–23)
BZE UR QL SCN: ABNORMAL
CA-I BLDV-SCNC: 1.24 MMOL/L (ref 1.1–1.33)
CALCIUM SERPL-MCNC: 9.2 MG/DL (ref 8.6–10.6)
CANNABINOIDS UR QL SCN: ABNORMAL
CARDIAC TROPONIN I PNL SERPL HS: 14 NG/L (ref 0–53)
CARDIAC TROPONIN I PNL SERPL HS: 16 NG/L (ref 0–53)
CHLORIDE BLDV-SCNC: 112 MMOL/L (ref 98–107)
CHLORIDE SERPL-SCNC: 114 MMOL/L (ref 98–107)
CO2 SERPL-SCNC: 19 MMOL/L (ref 21–32)
CREAT SERPL-MCNC: 2.39 MG/DL (ref 0.5–1.3)
EGFRCR SERPLBLD CKD-EPI 2021: 32 ML/MIN/1.73M*2
EOSINOPHIL # BLD AUTO: 0.12 X10*3/UL (ref 0–0.7)
EOSINOPHIL NFR BLD AUTO: 1.2 %
ERYTHROCYTE [DISTWIDTH] IN BLOOD BY AUTOMATED COUNT: 13.2 % (ref 11.5–14.5)
FENTANYL+NORFENTANYL UR QL SCN: ABNORMAL
GLUCOSE BLD MANUAL STRIP-MCNC: 123 MG/DL (ref 74–99)
GLUCOSE BLD MANUAL STRIP-MCNC: 125 MG/DL (ref 74–99)
GLUCOSE BLDV-MCNC: 111 MG/DL (ref 74–99)
GLUCOSE SERPL-MCNC: 109 MG/DL (ref 74–99)
HCO3 BLDV-SCNC: 20.2 MMOL/L (ref 22–26)
HCT VFR BLD AUTO: 33.6 % (ref 41–52)
HCT VFR BLD EST: 37 % (ref 41–52)
HGB BLD-MCNC: 11.4 G/DL (ref 13.5–17.5)
HGB BLDV-MCNC: 12.4 G/DL (ref 13.5–17.5)
IMM GRANULOCYTES # BLD AUTO: 0.03 X10*3/UL (ref 0–0.7)
IMM GRANULOCYTES NFR BLD AUTO: 0.3 % (ref 0–0.9)
INHALED O2 CONCENTRATION: 21 %
INR PPP: 1.6 (ref 0.9–1.1)
LACTATE BLDV-SCNC: 1.5 MMOL/L (ref 0.4–2)
LYMPHOCYTES # BLD AUTO: 1.42 X10*3/UL (ref 1.2–4.8)
LYMPHOCYTES NFR BLD AUTO: 14 %
MAGNESIUM SERPL-MCNC: 2.32 MG/DL (ref 1.6–2.4)
MCH RBC QN AUTO: 31.5 PG (ref 26–34)
MCHC RBC AUTO-ENTMCNC: 33.9 G/DL (ref 32–36)
MCV RBC AUTO: 93 FL (ref 80–100)
METHADONE UR QL SCN: ABNORMAL
MONOCYTES # BLD AUTO: 0.99 X10*3/UL (ref 0.1–1)
MONOCYTES NFR BLD AUTO: 9.8 %
NEUTROPHILS # BLD AUTO: 7.5 X10*3/UL (ref 1.2–7.7)
NEUTROPHILS NFR BLD AUTO: 74 %
NRBC BLD-RTO: 0 /100 WBCS (ref 0–0)
OPIATES UR QL SCN: ABNORMAL
OXYCODONE+OXYMORPHONE UR QL SCN: ABNORMAL
OXYHGB MFR BLDV: 39.2 % (ref 45–75)
P AXIS: 80 DEGREES
P OFFSET: 201 MS
P ONSET: 143 MS
PCO2 BLDV: 41 MM HG (ref 41–51)
PCP UR QL SCN: ABNORMAL
PH BLDV: 7.3 PH (ref 7.33–7.43)
PLATELET # BLD AUTO: 142 X10*3/UL (ref 150–450)
PO2 BLDV: 28 MM HG (ref 35–45)
POTASSIUM BLDV-SCNC: 3.9 MMOL/L (ref 3.5–5.3)
POTASSIUM SERPL-SCNC: 3.8 MMOL/L (ref 3.5–5.3)
PR INTERVAL: 156 MS
PROT SERPL-MCNC: 7.3 G/DL (ref 6.4–8.2)
PROTHROMBIN TIME: 18 SECONDS (ref 9.8–12.8)
Q ONSET: 221 MS
QRS COUNT: 16 BEATS
QRS DURATION: 88 MS
QT INTERVAL: 438 MS
QTC CALCULATION(BAZETT): 550 MS
QTC FREDERICIA: 510 MS
R AXIS: 86 DEGREES
RBC # BLD AUTO: 3.62 X10*6/UL (ref 4.5–5.9)
SAO2 % BLDV: 41 % (ref 45–75)
SODIUM BLDV-SCNC: 142 MMOL/L (ref 136–145)
SODIUM SERPL-SCNC: 142 MMOL/L (ref 136–145)
T AXIS: 109 DEGREES
T OFFSET: 440 MS
VENTRICULAR RATE: 95 BPM
WBC # BLD AUTO: 10.1 X10*3/UL (ref 4.4–11.3)

## 2024-09-07 PROCEDURE — 85025 COMPLETE CBC W/AUTO DIFF WBC: CPT | Performed by: EMERGENCY MEDICINE

## 2024-09-07 PROCEDURE — 84484 ASSAY OF TROPONIN QUANT: CPT | Performed by: EMERGENCY MEDICINE

## 2024-09-07 PROCEDURE — 82330 ASSAY OF CALCIUM: CPT | Performed by: EMERGENCY MEDICINE

## 2024-09-07 PROCEDURE — 96376 TX/PRO/DX INJ SAME DRUG ADON: CPT

## 2024-09-07 PROCEDURE — 82947 ASSAY GLUCOSE BLOOD QUANT: CPT

## 2024-09-07 PROCEDURE — 99233 SBSQ HOSP IP/OBS HIGH 50: CPT

## 2024-09-07 PROCEDURE — 85610 PROTHROMBIN TIME: CPT | Performed by: EMERGENCY MEDICINE

## 2024-09-07 PROCEDURE — 99285 EMERGENCY DEPT VISIT HI MDM: CPT | Performed by: EMERGENCY MEDICINE

## 2024-09-07 PROCEDURE — 96365 THER/PROPH/DIAG IV INF INIT: CPT

## 2024-09-07 PROCEDURE — 84484 ASSAY OF TROPONIN QUANT: CPT

## 2024-09-07 PROCEDURE — 36415 COLL VENOUS BLD VENIPUNCTURE: CPT | Performed by: EMERGENCY MEDICINE

## 2024-09-07 PROCEDURE — 82947 ASSAY GLUCOSE BLOOD QUANT: CPT | Performed by: EMERGENCY MEDICINE

## 2024-09-07 PROCEDURE — 71046 X-RAY EXAM CHEST 2 VIEWS: CPT

## 2024-09-07 PROCEDURE — 1200000002 HC GENERAL ROOM WITH TELEMETRY DAILY

## 2024-09-07 PROCEDURE — 96375 TX/PRO/DX INJ NEW DRUG ADDON: CPT

## 2024-09-07 PROCEDURE — 2500000004 HC RX 250 GENERAL PHARMACY W/ HCPCS (ALT 636 FOR OP/ED): Mod: JZ,SE

## 2024-09-07 PROCEDURE — 83880 ASSAY OF NATRIURETIC PEPTIDE: CPT

## 2024-09-07 PROCEDURE — 2500000004 HC RX 250 GENERAL PHARMACY W/ HCPCS (ALT 636 FOR OP/ED)

## 2024-09-07 PROCEDURE — 83735 ASSAY OF MAGNESIUM: CPT | Performed by: EMERGENCY MEDICINE

## 2024-09-07 PROCEDURE — 2500000004 HC RX 250 GENERAL PHARMACY W/ HCPCS (ALT 636 FOR OP/ED): Mod: SE | Performed by: EMERGENCY MEDICINE

## 2024-09-07 PROCEDURE — 80307 DRUG TEST PRSMV CHEM ANLYZR: CPT

## 2024-09-07 PROCEDURE — 84132 ASSAY OF SERUM POTASSIUM: CPT | Performed by: EMERGENCY MEDICINE

## 2024-09-07 PROCEDURE — 99285 EMERGENCY DEPT VISIT HI MDM: CPT

## 2024-09-07 PROCEDURE — 2500000001 HC RX 250 WO HCPCS SELF ADMINISTERED DRUGS (ALT 637 FOR MEDICARE OP): Mod: SE

## 2024-09-07 PROCEDURE — 36415 COLL VENOUS BLD VENIPUNCTURE: CPT

## 2024-09-07 PROCEDURE — 71046 X-RAY EXAM CHEST 2 VIEWS: CPT | Performed by: RADIOLOGY

## 2024-09-07 RX ORDER — CLOPIDOGREL BISULFATE 75 MG/1
75 TABLET ORAL
Status: DISPENSED | OUTPATIENT
Start: 2024-09-07

## 2024-09-07 RX ORDER — ARIPIPRAZOLE 5 MG/1
5 TABLET ORAL DAILY
Status: DISPENSED | OUTPATIENT
Start: 2024-09-07

## 2024-09-07 RX ORDER — AMLODIPINE BESYLATE 2.5 MG/1
2.5 TABLET ORAL DAILY
Status: DISPENSED | OUTPATIENT
Start: 2024-09-07

## 2024-09-07 RX ORDER — NITROGLYCERIN 0.4 MG/1
0.4 TABLET SUBLINGUAL ONCE
Status: DISCONTINUED | OUTPATIENT
Start: 2024-09-07 | End: 2024-09-07

## 2024-09-07 RX ORDER — PANTOPRAZOLE SODIUM 40 MG/1
40 TABLET, DELAYED RELEASE ORAL DAILY
Status: DISPENSED | OUTPATIENT
Start: 2024-09-07

## 2024-09-07 RX ORDER — BUPROPION HYDROCHLORIDE 150 MG/1
300 TABLET ORAL DAILY
Status: DISPENSED | OUTPATIENT
Start: 2024-09-07

## 2024-09-07 RX ORDER — COLCHICINE 0.6 MG/1
0.6 TABLET ORAL ONCE
Status: COMPLETED | OUTPATIENT
Start: 2024-09-07 | End: 2024-09-07

## 2024-09-07 RX ORDER — LISINOPRIL 10 MG/1
10 TABLET ORAL DAILY
Status: DISPENSED | OUTPATIENT
Start: 2024-09-07

## 2024-09-07 RX ORDER — NITROGLYCERIN 0.4 MG/1
0.4 TABLET SUBLINGUAL EVERY 5 MIN PRN
Status: COMPLETED | OUTPATIENT
Start: 2024-09-07 | End: 2024-09-07

## 2024-09-07 RX ORDER — ACETAMINOPHEN 10 MG/ML
1000 INJECTION, SOLUTION INTRAVENOUS ONCE
Status: COMPLETED | OUTPATIENT
Start: 2024-09-07 | End: 2024-09-07

## 2024-09-07 RX ORDER — NITROGLYCERIN 0.4 MG/1
0.4 TABLET SUBLINGUAL ONCE
Status: COMPLETED | OUTPATIENT
Start: 2024-09-07 | End: 2024-09-07

## 2024-09-07 RX ORDER — TOPIRAMATE 50 MG/1
50 TABLET, FILM COATED ORAL 2 TIMES DAILY
Status: DISPENSED | OUTPATIENT
Start: 2024-09-07

## 2024-09-07 RX ORDER — HYDROXYZINE HYDROCHLORIDE 25 MG/1
25 TABLET, FILM COATED ORAL EVERY 6 HOURS PRN
Status: DISPENSED | OUTPATIENT
Start: 2024-09-07

## 2024-09-07 RX ORDER — NITROGLYCERIN 0.4 MG/1
0.4 TABLET SUBLINGUAL EVERY 5 MIN PRN
Status: ACTIVE | OUTPATIENT
Start: 2024-09-07

## 2024-09-07 RX ORDER — MORPHINE SULFATE 4 MG/ML
1 INJECTION INTRAVENOUS ONCE
Status: COMPLETED | OUTPATIENT
Start: 2024-09-07 | End: 2024-09-07

## 2024-09-07 RX ORDER — POLYETHYLENE GLYCOL 3350 17 G/17G
17 POWDER, FOR SOLUTION ORAL DAILY
Status: DISPENSED | OUTPATIENT
Start: 2024-09-07

## 2024-09-07 RX ORDER — DEXTROSE 50 % IN WATER (D50W) INTRAVENOUS SYRINGE
12.5
Status: ACTIVE | OUTPATIENT
Start: 2024-09-07

## 2024-09-07 RX ORDER — CARVEDILOL 12.5 MG/1
12.5 TABLET ORAL
Status: DISPENSED | OUTPATIENT
Start: 2024-09-07

## 2024-09-07 RX ORDER — DEXTROSE 50 % IN WATER (D50W) INTRAVENOUS SYRINGE
25
Status: ACTIVE | OUTPATIENT
Start: 2024-09-07

## 2024-09-07 RX ORDER — ATORVASTATIN CALCIUM 80 MG/1
80 TABLET, FILM COATED ORAL NIGHTLY
Status: DISPENSED | OUTPATIENT
Start: 2024-09-07

## 2024-09-07 RX ORDER — NITROGLYCERIN 0.4 MG/1
0.4 TABLET SUBLINGUAL EVERY 5 MIN PRN
Status: DISCONTINUED | OUTPATIENT
Start: 2024-09-07 | End: 2024-09-07

## 2024-09-07 RX ORDER — MORPHINE SULFATE 4 MG/ML
4 INJECTION INTRAVENOUS ONCE
Status: COMPLETED | OUTPATIENT
Start: 2024-09-07 | End: 2024-09-07

## 2024-09-07 RX ORDER — EZETIMIBE 10 MG/1
10 TABLET ORAL DAILY
Status: DISPENSED | OUTPATIENT
Start: 2024-09-07

## 2024-09-07 RX ORDER — NITROGLYCERIN 0.4 MG/1
0.4 TABLET SUBLINGUAL
Status: DISCONTINUED | OUTPATIENT
Start: 2024-09-07 | End: 2024-09-07

## 2024-09-07 RX ORDER — INSULIN LISPRO 100 [IU]/ML
0-5 INJECTION, SOLUTION INTRAVENOUS; SUBCUTANEOUS
Status: DISPENSED | OUTPATIENT
Start: 2024-09-08

## 2024-09-07 RX ADMIN — MORPHINE SULFATE 4 MG: 4 INJECTION, SOLUTION INTRAMUSCULAR; INTRAVENOUS at 14:17

## 2024-09-07 RX ADMIN — PANTOPRAZOLE SODIUM 40 MG: 40 TABLET, DELAYED RELEASE ORAL at 17:56

## 2024-09-07 RX ADMIN — MORPHINE SULFATE 1 MG: 4 INJECTION INTRAVENOUS at 22:13

## 2024-09-07 RX ADMIN — AMLODIPINE BESYLATE 2.5 MG: 2.5 TABLET ORAL at 17:56

## 2024-09-07 RX ADMIN — ACETAMINOPHEN 1000 MG: 1000 INJECTION INTRAVENOUS at 18:12

## 2024-09-07 RX ADMIN — TOPIRAMATE 50 MG: 50 TABLET, FILM COATED ORAL at 20:21

## 2024-09-07 RX ADMIN — NITROGLYCERIN 0.4 MG: 0.4 TABLET SUBLINGUAL at 12:41

## 2024-09-07 RX ADMIN — ISOSORBIDE MONONITRATE 90 MG: 60 TABLET, EXTENDED RELEASE ORAL at 17:56

## 2024-09-07 RX ADMIN — APIXABAN 5 MG: 5 TABLET, FILM COATED ORAL at 20:21

## 2024-09-07 RX ADMIN — COLCHICINE 0.6 MG: 0.6 TABLET ORAL at 11:57

## 2024-09-07 RX ADMIN — ATORVASTATIN CALCIUM 80 MG: 80 TABLET, FILM COATED ORAL at 20:21

## 2024-09-07 RX ADMIN — NITROGLYCERIN 0.4 MG: 0.4 TABLET SUBLINGUAL at 13:01

## 2024-09-07 RX ADMIN — NITROGLYCERIN 0.4 MG: 0.4 TABLET SUBLINGUAL at 13:09

## 2024-09-07 SDOH — SOCIAL STABILITY: SOCIAL INSECURITY: HAS ANYONE EVER THREATENED TO HURT YOUR FAMILY OR YOUR PETS?: NO

## 2024-09-07 SDOH — SOCIAL STABILITY: SOCIAL INSECURITY: HAVE YOU HAD THOUGHTS OF HARMING ANYONE ELSE?: NO

## 2024-09-07 SDOH — SOCIAL STABILITY: SOCIAL INSECURITY: DO YOU FEEL ANYONE HAS EXPLOITED OR TAKEN ADVANTAGE OF YOU FINANCIALLY OR OF YOUR PERSONAL PROPERTY?: NO

## 2024-09-07 SDOH — SOCIAL STABILITY: SOCIAL INSECURITY: DOES ANYONE TRY TO KEEP YOU FROM HAVING/CONTACTING OTHER FRIENDS OR DOING THINGS OUTSIDE YOUR HOME?: NO

## 2024-09-07 SDOH — SOCIAL STABILITY: SOCIAL INSECURITY: ARE YOU OR HAVE YOU BEEN THREATENED OR ABUSED PHYSICALLY, EMOTIONALLY, OR SEXUALLY BY ANYONE?: NO

## 2024-09-07 SDOH — SOCIAL STABILITY: SOCIAL INSECURITY: HAVE YOU HAD ANY THOUGHTS OF HARMING ANYONE ELSE?: NO

## 2024-09-07 SDOH — SOCIAL STABILITY: SOCIAL INSECURITY: ARE THERE ANY APPARENT SIGNS OF INJURIES/BEHAVIORS THAT COULD BE RELATED TO ABUSE/NEGLECT?: NO

## 2024-09-07 SDOH — SOCIAL STABILITY: SOCIAL INSECURITY: WERE YOU ABLE TO COMPLETE ALL THE BEHAVIORAL HEALTH SCREENINGS?: YES

## 2024-09-07 SDOH — SOCIAL STABILITY: SOCIAL INSECURITY: ABUSE: ADULT

## 2024-09-07 SDOH — SOCIAL STABILITY: SOCIAL INSECURITY: DO YOU FEEL UNSAFE GOING BACK TO THE PLACE WHERE YOU ARE LIVING?: NO

## 2024-09-07 ASSESSMENT — ACTIVITIES OF DAILY LIVING (ADL)
HEARING - LEFT EAR: FUNCTIONAL
PATIENT'S MEMORY ADEQUATE TO SAFELY COMPLETE DAILY ACTIVITIES?: YES
BATHING: INDEPENDENT
HEARING - RIGHT EAR: FUNCTIONAL
GROOMING: INDEPENDENT
JUDGMENT_ADEQUATE_SAFELY_COMPLETE_DAILY_ACTIVITIES: YES
ADEQUATE_TO_COMPLETE_ADL: YES
TOILETING: INDEPENDENT
DRESSING YOURSELF: INDEPENDENT
WALKS IN HOME: INDEPENDENT
FEEDING YOURSELF: INDEPENDENT
LACK_OF_TRANSPORTATION: NO

## 2024-09-07 ASSESSMENT — LIFESTYLE VARIABLES
HOW OFTEN DO YOU HAVE 6 OR MORE DRINKS ON ONE OCCASION: NEVER
EVER FELT BAD OR GUILTY ABOUT YOUR DRINKING: NO
TOTAL SCORE: 0
SKIP TO QUESTIONS 9-10: 1
AUDIT-C TOTAL SCORE: 0
AUDIT-C TOTAL SCORE: 0
HOW OFTEN DO YOU HAVE A DRINK CONTAINING ALCOHOL: NEVER
HAVE PEOPLE ANNOYED YOU BY CRITICIZING YOUR DRINKING: NO
PRESCIPTION_ABUSE_PAST_12_MONTHS: NO
EVER HAD A DRINK FIRST THING IN THE MORNING TO STEADY YOUR NERVES TO GET RID OF A HANGOVER: NO
HAVE YOU EVER FELT YOU SHOULD CUT DOWN ON YOUR DRINKING: NO
SUBSTANCE_ABUSE_PAST_12_MONTHS: YES
HOW MANY STANDARD DRINKS CONTAINING ALCOHOL DO YOU HAVE ON A TYPICAL DAY: PATIENT DOES NOT DRINK

## 2024-09-07 ASSESSMENT — PAIN SCALES - GENERAL
PAINLEVEL_OUTOF10: 8
PAINLEVEL_OUTOF10: 7
PAINLEVEL_OUTOF10: 8

## 2024-09-07 ASSESSMENT — COGNITIVE AND FUNCTIONAL STATUS - GENERAL
DAILY ACTIVITIY SCORE: 24
MOBILITY SCORE: 24
DAILY ACTIVITIY SCORE: 24
PATIENT BASELINE BEDBOUND: NO
MOBILITY SCORE: 24

## 2024-09-07 ASSESSMENT — PAIN - FUNCTIONAL ASSESSMENT
PAIN_FUNCTIONAL_ASSESSMENT: 0-10

## 2024-09-07 ASSESSMENT — PAIN DESCRIPTION - LOCATION
LOCATION: CHEST
LOCATION: CHEST

## 2024-09-07 ASSESSMENT — PATIENT HEALTH QUESTIONNAIRE - PHQ9
SUM OF ALL RESPONSES TO PHQ9 QUESTIONS 1 & 2: 0
1. LITTLE INTEREST OR PLEASURE IN DOING THINGS: NOT AT ALL
2. FEELING DOWN, DEPRESSED OR HOPELESS: NOT AT ALL

## 2024-09-07 ASSESSMENT — PAIN DESCRIPTION - DESCRIPTORS: DESCRIPTORS: PRESSURE

## 2024-09-07 ASSESSMENT — PAIN DESCRIPTION - ORIENTATION: ORIENTATION: MID

## 2024-09-07 ASSESSMENT — PAIN DESCRIPTION - PAIN TYPE
TYPE: ACUTE PAIN
TYPE: CHRONIC PAIN

## 2024-09-07 NOTE — HOSPITAL COURSE
Alex Hall is a 48 year old male with a history CAD s/p FADY (May and July 2024), post MI pericarditis with pericardial effusion (s/p 3 months of colchicine) , DVT (Eliquis), CVA with residual left-sided weakness, PAD s/p L BKA, CKD, T2DM, polysubstance use presenting with chest pain. He has history of CAD and recent FADY to LM-LAD and LCx in 05/2024 done at [facility]. He had another LHC in 07/2024 which showed patent stents but 99% occluded septal with collateral from RPDA. He was recently discharged on 9/6 from the hospital due to chest pain that was attributed to cocaine use.  Patient states after discharge he went to an adult shelter and had recurrent chest pain on 9/7 morning that prompted his presentation to the ED. He was admitted to the cardiology service due to his recent hospitalization. In the ED, workup was negative for any ACS and his chest pain improved with morphine IV 4mg. He notes that the pain this time was not relieved with nitroglycerin. It was concluded that the patient is most likely experiencing chest pain secondary to cocaine use once again. Due to + UDS for both cocaine and opioids, inpatient opoid pain management was avoided. The patient was treated with tylenol and a lidocaine patch with mild pain control. Pt requested PT/OT eval prior to discharge, and PT did not recommend SNF. In addition, patient contacted rehab ([facility]) and is interested in going there after returning to the [facility] in [place] prior to rehab. Due to patient repetitively advocating that his pain (general and chest) was not controlled with non-opoid and NSAID (due to CKD), the patient was discharged with 9x low doses percocet. Patient medically optimal for discharge and for rehab.

## 2024-09-07 NOTE — ED TRIAGE NOTES
Pt to ED for c/o chest pain that has worsened since being discharged after being treated for pericarditis. States was told to return if chest pain got worse

## 2024-09-07 NOTE — DISCHARGE SUMMARY
Discharge Diagnosis  Chest pain, unspecified type    Issues Requiring Follow-Up  Patient has to follow up recent echo with repeat or MRI in 3 months   Chemical Substance dependence     Discharge Meds     Medication List      START taking these medications     * amLODIPine 5 mg tablet; Commonly known as: Norvasc; Take 0.5 tablets   (2.5 mg) by mouth once daily.   * amLODIPine 2.5 mg tablet; Commonly known as: Norvasc; Take 1 tablet   (2.5 mg) by mouth once daily.; Start taking on: September 7, 2024   nitroglycerin 0.4 mg SL tablet; Commonly known as: Nitrostat; Place 1   tablet (0.4 mg) under the tongue every 5 minutes if needed for chest pain.   May repeat every 5 minutes for up to 3 doses.  * This list has 2 medication(s) that are the same as other medications   prescribed for you. Read the directions carefully, and ask your doctor or   other care provider to review them with you.     CHANGE how you take these medications     ARIPiprazole 5 mg tablet; Commonly known as: Abilify; Take 1 tablet (5   mg) by mouth once daily.; What changed: when to take this   Eliquis 5 mg tablet; Generic drug: apixaban; Take 1 tablet (5 mg) by   mouth 2 times a day.; What changed: when to take this   lisinopril 10 mg tablet; Take 1 tablet (10 mg) by mouth once daily.;   What changed: medication strength     CONTINUE taking these medications     acetaminophen 500 mg tablet; Commonly known as: Tylenol   atorvastatin 80 mg tablet; Commonly known as: Lipitor; Take 1 tablet (80   mg) by mouth once daily at bedtime.   buPROPion  mg 24 hr tablet; Commonly known as: Wellbutrin XL; Take   2 tablets (300 mg) by mouth once daily. Do not crush, chew, or split.   carvedilol 12.5 mg tablet; Commonly known as: Coreg; Take 1 tablet (12.5   mg) by mouth 2 times daily (morning and late afternoon).   clopidogrel 75 mg tablet; Commonly known as: Plavix; Take 1 tablet (75   mg) by mouth once daily.   empagliflozin 10 mg; Commonly known as:  Jardiance; Take 1 tablet (10 mg)   by mouth once daily.   ezetimibe 10 mg tablet; Commonly known as: Zetia; Take 1 tablet (10 mg)   by mouth once daily.   hydrOXYzine HCL 25 mg tablet; Commonly known as: Atarax   isosorbide mononitrate ER 30 mg 24 hr tablet; Commonly known as: Imdur;   Take 3 tablets (90 mg) by mouth once daily.   pantoprazole 40 mg EC tablet; Commonly known as: ProtoNix; Take 1 tablet   (40 mg) by mouth once daily.   topiramate 50 mg tablet; Commonly known as: Topamax       Test Results Pending At Discharge  Pending Labs       No current pending labs.            Hospital Course  Alex Hall is a 48 year old male with a history CAD s/p FADY (May and July 2024), post MI pericarditis with pericardial effusion (s/p 3 months of colchicine) , DVT (Eliquis), CVA with residual left-sided weakness, PAD s/p L BKA, CKD, T2DM, polysubstance use presenting with chest pain. He has history of CAD and recent FADY to LM-LAD and LCx in 05/2024 done at [facility]. He had another LHC in 07/2024 which showed patent stents but 99% occluded septal with collateral from RPDA.  Patient reportedly had verbal confrontation with his father, and decided to walk to the shelter to spend the night. During his walk he experienced severe CP had a mechanical fall w/o syncope, confusion or head trauma. He described pain as a pressure radiating to his left shoulder and jaws bilaterally.In the ED patient was tachycardic, with a /103 and was given 2 sublingual nitros and morphine which improved his pain. Troponin's were negative, CXR was negative, EKG showed sinus tachycardia, and BNP was elevated at 549 but patient was Euvolemic on exam.   9/5 Patient was  admitted to the medicine unit and started on home GDMT,and given 1mg of morphine as he had continued to have pain. Patient was without, any electrolyte derangements and urine tox was ordered which was positive for cocaine. He was started on amlodipine 5 mg and had reduced  pain. 9/6  overnight patient had increased pain, EKG was ordered which revealed NSR, and bedside exam revealed that there may be a musculoskeletal component to the pain so he was given 15 mg of tramadol. Patient remain HDS and received TTE later that morning which showed  borderline to moderately reduced ejection fraction. He was counseled on smoking cessation and discontinuing of chemical substances. Patient will be referred for Cardiology follow up with Dr. Gentile and PCP follow up. He will have repeat echo Vs MRI in 3 months. There were no barriers to discharge at this time and he was discharged with all home meds including new amlodipine medications as there were concerns with him obtaining them as he is on disability with unstable housing.        Pertinent Physical Exam At Time of Discharge  Vitals:    09/06/24 0726   BP: 117/71   Pulse: 81   Resp: 18   Temp: 36.4 °C (97.5 °F)   SpO2: 99%      Physical Exam  Constitutional:       General: He is not in acute distress.  Cardiovascular:      Rate and Rhythm: Normal rate and regular rhythm.      Heart sounds: No murmur heard.     No friction rub. No gallop.   Pulmonary:      Effort: Pulmonary effort is normal. No respiratory distress.      Breath sounds: Normal breath sounds. No wheezing.   Abdominal:      General: There is no distension.      Tenderness: There is no abdominal tenderness.   Musculoskeletal:      Comments: Patient without right lower extremity swelling. LLE BKA.    Neurological:      Mental Status: He is alert.         Outpatient Follow-Up  Patient has Cardiology appointment follow up   Follow up with PCP       Jazmyn Kirk MD  PGY-1 Internal Medicine Resident

## 2024-09-07 NOTE — H&P
CC: Chest pain    HPI  Alex Hall is a 48 y.o. male  48 y.o. male with CAD s/p FADY (May and July 2024), post MI pericarditis with pericardial effusion (s/p 3 months of colchicine) , DVT (Eliquis), CVA with residual left-sided weakness, PAD s/p L BKA, CKD, T2DM, polysubstance use presenting with chest pain that is described as substernal pressure and constant. Of note, he had a FADY to LM-LAD and LCx in 05/2024 done at [facility]. He had another LHC in 07/2024 which showed patent stents but 99% occluded septal with collateral from RPDA. Patient was notably discharged on 9/6 after being admitted for similar concerns. Patient states since discharge he went to a shelter and was feeling well until this morning when he started to feel severe chest pressure again. States it felt the same as the pain he felt when he was recently admitted.  He states the pain radiates to his left ribs and is constant in nature. Rated at a 7/10 prior to receiving morphine in the ED and then rated at a 4/10. He states nitroglycerin did not help his pain.  He also notes that he coughed up a blood clot when he arrived at the adult shelter.  He does admit to previous cocaine use but states he has not used any since his discharge.  He also states that he has been taking his medications regularly and has not missed any doses.    There was low suspicion for ACS in the ED as his EKG was similar to his priors, troponins were negative, and his chest pain appeared to be more chronic.  There was also low concern for cardiac tamponade as he was hemodynamically stable.  Additionally there was low concern for heart failure exacerbation as his BNP was 80 and his exam did not show evidence of fluid overload.  Chest x-ray was also negative for any acute cardiopulmonary process.  Ultimately the patient was admitted to the cardiology service due to his recent hospitalization.     Past Medical History  He has a past medical history of Stroke (cerebrum)  "(Multi) (2017).     Surgical History  He has a past surgical history that includes Leg amputation (Left) and Hip surgery (Left).     Social History  Patient is a  0.5 pack per day smoker since 12 y/o   He does not use alcohol   Patient had recent admission related to cocaine use but states he is no longer using.   Family History  Mother with CABG no longer living  Grandfather with pacemaker     Allergies  Duloxetine, Voltaren [diclofenac sodium], Nsaids (non-steroidal anti-inflammatory drug), and Varenicline    Vitals:   /85 (BP Location: Right arm, Patient Position: Lying)   Pulse 85   Temp 36.6 °C (97.9 °F) (Temporal)   Resp 18   Ht 1.778 m (5' 10\")   Wt 65.8 kg (145 lb)   SpO2 99%   BMI 20.81 kg/m²        - Labs:  Lab Results   Component Value Date     09/07/2024    K 3.8 09/07/2024     (H) 09/07/2024    CO2 19 (L) 09/07/2024    BUN 39 (H) 09/07/2024    CREATININE 2.39 (H) 09/07/2024    GLUCOSE 109 (H) 09/07/2024    CALCIUM 9.2 09/07/2024       Lab Results   Component Value Date    WBC 10.1 09/07/2024    HGB 11.4 (L) 09/07/2024    HCT 33.6 (L) 09/07/2024    MCV 93 09/07/2024     (L) 09/07/2024     Troponin Trend since admission:  Lab Results   Component Value Date    TROPHS 14 09/07/2024    TROPHS 16 09/07/2024    TROPHS 40 09/05/2024    TROPHS 45 09/05/2024    TROPHS 42 09/05/2024     Most Recent:  Lab Results   Component Value Date    TROPHS 14 09/07/2024          - Imaging:   XR chest 2 views    Result Date: 9/7/2024  Interpreted By:  Leoncio Oneal, STUDY: XR CHEST 2 VIEWS;  9/7/2024 12:19 pm   INDICATION: Signs/Symptoms:chest pain.     COMPARISON: 09/05/2024   ACCESSION NUMBER(S): [id number]   ORDERING CLINICIAN: BERNARDO PITTMAN   FINDINGS:         CARDIOMEDIASTINAL SILHOUETTE: Cardiomediastinal silhouette is normal in size and configuration.   LUNGS: Lungs are clear.   ABDOMEN: No remarkable upper abdominal findings.   BONES: No acute osseous changes.       1.  No evidence of " acute cardiopulmonary process.       MACRO: None   Signed by: Leoncio Oneal 9/7/2024 1:07 PM Dictation workstation:   [id number]    ECG 12 Lead    Result Date: 9/7/2024  Normal sinus rhythm Prolonged QT Abnormal ECG When compared with ECG of 17-MAR-2009 07:44, Non-specific change in ST segment in Anterior leads T wave inversion now evident in Lateral leads       Cardiac/GI Hx  Infectious/Oncologic Timeline:  - EKG:   Sinus rhythm. Regular rate. No acute signs of ischemia  - Echocardiography:   Results for orders placed during the hospital encounter of 09/05/24    Transthoracic Echo (TTE) Limited    Narrative  [facility], [address] [place] [address]  Tel [phone] and Fax [phone]    TRANSTHORACIC ECHOCARDIOGRAM REPORT      Patient Name:      MACIEL Easley Physician:    [id number] Sai Blankenship MD  Study Date:        9/5/2024             Ordering Provider:    [id number] MARLENY ARIAS  MRN/PID:           [id number]             Fellow:  Accession#:        [id number]         Nurse:                Sharonda Baldwin RN  Date of Birth/Age: 1975 / 48      Sonographer:          Alysha Dale RDCS  years  Gender:            M                    Additional Staff:  Height:            177.80 cm            Admit Date:           9/5/2024  Weight:            70.31 kg             Admission Status:     Inpatient -  Routine  BSA / BMI:         1.87 m2 / 22.24      Encounter#:           [id number]  kg/m2  Blood Pressure:    154/90 mmHg          Department Location:  [facility] [place] Non  Invasive    Study Type:    TRANSTHORACIC ECHO (TTE) LIMITED  Diagnosis/ICD: Chest pain, unspecified-R07.9  Indication:    Chest pain  CPT Code:      Echo Limited-51892; Color Doppler-78591; Doppler Limited-13954    Patient History:  Pertinent History: Chest Pain, Cardiomyopathy and HTN. H/O pericardial effusion,  CAD s/p PCI, CKD, diabetes, PAD.    Study Detail: The following Echo studies  were performed: 2D, M-Mode, Doppler and  color flow. Technically challenging study due to patient lying in  supine position and prominent lung artifact. Definity used as a  contrast agent for endocardial border definition. Total contrast  used for this procedure was 3.0 mL via IV push.      PHYSICIAN INTERPRETATION:  Left Ventricle: Left ventricular ejection fraction is moderately decreased, by visual estimate at 35-40%. There is global hypokinesis of the left ventricle with minor regional variations. The left ventricular cavity size is normal. Left ventricular diastolic filling was not assessed. There is no definite left ventricular thrombus visualized. There is E/A wave fusion.  Left Atrium: The left atrium is normal in size.  Right Ventricle: The right ventricle is normal in size. There is normal right ventricular global systolic function.  Right Atrium: The right atrium is normal in size.  Aortic Valve: The aortic valve is trileaflet. There is trace to mild aortic valve regurgitation. The peak instantaneous gradient of the aortic valve is 6.5 mmHg.  Mitral Valve: The mitral valve is normal in structure. There is mild mitral valve regurgitation.  Tricuspid Valve: The tricuspid valve is structurally normal. There is trace tricuspid regurgitation.  Pulmonic Valve: The pulmonic valve is structurally normal. There is no indication of pulmonic valve regurgitation.  Pericardium: There is a trivial pericardial effusion.  Aorta: The aortic root is normal.  Systemic Veins: The inferior vena cava appears to be of normal size. There is IVC inspiratory collapse greater than 50%.  In comparison to the previous echocardiogram(s): There are no prior studies on this patient for comparison purposes.      CONCLUSIONS:  1. Poorly visualized anatomical structures due to suboptimal image quality.  2. Left ventricular ejection fraction is moderately decreased, by visual estimate at 35-40%.  3. There is global hypokinesis of the left  ventricle with minor regional variations.  4. No left ventricular thrombus visualized.  5. There is normal right ventricular global systolic function.    QUANTITATIVE DATA SUMMARY:  2D MEASUREMENTS:  Normal Ranges:  Ao Root d:     2.90 cm  (2.0-3.7cm)  LAs:           2.80 cm  (2.7-4.0cm)  IVSd:          0.74 cm  (0.6-1.1cm)  LVPWd:         0.90 cm  (0.6-1.1cm)  LVIDd:         5.14 cm  (3.9-5.9cm)  LVIDs:         3.77 cm  LV Mass Index: 79 g/m2  LVEDV Index:   61 ml/m2  LV % FS        26.8 %    LA VOLUME:  Normal Ranges:  LA Vol A4C:        37.8 ml    (22+/-6mL/m2)  LA Vol A2C:        33.2 ml  LA Vol BP:         37.0 ml  LA Vol Index A4C:  20.2ml/m2  LA Vol Index A2C:  17.7 ml/m2  LA Vol Index BP:   19.8 ml/m2  LA Area A4C:       14.3 cm2  LA Area A2C:       12.8 cm2  LA Major Axis A4C: 4.6 cm  LA Major Axis A2C: 4.2 cm  LA Volume Index:   19.6 ml/m2    RA VOLUME BY A/L METHOD:  Normal Ranges:  RA Area A4C: 8.8 cm2    M-MODE MEASUREMENTS:  Normal Ranges:  AoV Exc: 2.30 cm (1.5-2.5cm)    AORTA MEASUREMENTS:  Normal Ranges:  AoV Exc: 2.30 cm (1.5-2.5cm)    LV SYSTOLIC FUNCTION BY 2D PLANIMETRY (MOD):  Normal Ranges:  EF-A4C View:    41 % (>=55%)  EF-A2C View:    33 %  EF-Biplane:     32 %  EF-Visual:      38 %  LV EF Reported: 38 %    LV DIASTOLIC FUNCTION:  Normal Ranges:  MV Peak E:    0.61 m/s   (0.7-1.2 m/s)  MV Peak A:    1.01 m/s   (0.42-0.7 m/s)  E/A Ratio:    0.61       (1.0-2.2)  MV e'         0.080 m/s  (>8.0)  MV lateral e' 0.08 m/s  MV medial e'  0.08 m/s  MV A Dur:     77.07 msec  E/e' Ratio:   7.68       (<8.0)  MV DT:        112 msec   (150-240 msec)    MITRAL VALVE:  Normal Ranges:  MV DT: 112 msec (150-240msec)    AORTIC VALVE:  Normal Ranges:  AoV Vmax:      1.28 m/s (<=1.7m/s)  AoV Peak P.5 mmHg (<20mmHg)  LVOT Max Luis:  1.05 m/s (<=1.1m/s)  LVOT VTI:      15.93 cm  LVOT Diameter: 1.94 cm  (1.8-2.4cm)  AoV Area,Vmax: 2.42 cm2 (2.5-4.5cm2)      RIGHT VENTRICLE:  RV Basal 3.10 cm  RV Mid   2.20  cm  RV Major 6.3 cm  TAPSE:   20.0 mm  RV s'    0.11 m/s    TRICUSPID VALVE/RVSP:  Normal Ranges:  IVC Diam: 1.40 cm    PULMONIC VALVE:  Normal Ranges:  PV Max Luis: 0.8 m/s  (0.6-0.9m/s)  PV Max P.8 mmHg      [id number] Sai Blankenship MD  Electronically signed on 2024 at 8:23:39 PM        ** Final (Updated) **      Past Medical History  Past Medical History:   Diagnosis Date    Hypertension     NSTEMI (non-ST elevated myocardial infarction) (Multi)     x2 in     Pericarditis ([facility]-HCC)     Stroke (cerebrum) (Multi)        Surgical History  Past Surgical History:   Procedure Laterality Date    HIP SURGERY Left     LEG AMPUTATION Left     bka         Home Medications  Prior to Admission medications    Medication Sig Start Date End Date Taking? Authorizing Provider   acetaminophen (Tylenol) 500 mg tablet Take 2 tablets (1,000 mg) by mouth every 8 hours if needed for mild pain (1 - 3).    Historical Provider, MD   amLODIPine (Norvasc) 2.5 mg tablet Take 1 tablet (2.5 mg) by mouth once daily. 24  Jennifer Anaya MD MPH   amLODIPine (Norvasc) 5 mg tablet Take 0.5 tablets (2.5 mg) by mouth once daily. 24  Jennifer Anaya MD MPH   apixaban (Eliquis) 5 mg tablet Take 1 tablet (5 mg) by mouth 2 times a day. 24  Jennifer Anaya MD MPH   ARIPiprazole (Abilify) 5 mg tablet Take 1 tablet (5 mg) by mouth once daily. 24  Jennifer Anaya MD MPH   atorvastatin (Lipitor) 80 mg tablet Take 1 tablet (80 mg) by mouth once daily at bedtime. 24  Jennifer Anaya MD MPH   buPROPion XL (Wellbutrin XL) 150 mg 24 hr tablet Take 2 tablets (300 mg) by mouth once daily. Do not crush, chew, or split. 24  Jennifer Anaya MD MPH   carvedilol (Coreg) 12.5 mg tablet Take 1 tablet (12.5 mg) by mouth 2 times daily (morning and late afternoon). 24  Jennifer Anaya MD MPH   clopidogrel (Plavix) 75 mg tablet Take 1 tablet (75 mg) by mouth once daily. 24  Jennifer Anaya MD  MPH   empagliflozin (Jardiance) 10 mg Take 1 tablet (10 mg) by mouth once daily. 9/6/24 9/6/25  Jennifer Anaya MD MPH   ezetimibe (Zetia) 10 mg tablet Take 1 tablet (10 mg) by mouth once daily. 9/6/24 9/6/25  Jennifer Anaya MD MPH   hydrOXYzine HCL (Atarax) 25 mg tablet Take 1 tablet (25 mg) by mouth every 6 hours if needed. 8/8/24   Historical Provider, MD   isosorbide mononitrate ER (Imdur) 30 mg 24 hr tablet Take 3 tablets (90 mg) by mouth once daily. 9/6/24 9/6/25  Jennifer Anaya MD MPH   lisinopril 10 mg tablet Take 1 tablet (10 mg) by mouth once daily. 9/6/24 9/6/25  Jennifer Anaya MD MPH   nitroglycerin (Nitrostat) 0.4 mg SL tablet Place 1 tablet (0.4 mg) under the tongue every 5 minutes if needed for chest pain. May repeat every 5 minutes for up to 3 doses. 9/6/24 10/6/24  Jennifer Anaya MD MPH   pantoprazole (ProtoNix) 40 mg EC tablet Take 1 tablet (40 mg) by mouth once daily. 9/6/24 9/6/25  Jennifer Anaya MD MPH   topiramate (Topamax) 50 mg tablet Take 1 tablet (50 mg) by mouth twice a day. 8/15/24 2/11/25  Historical Provider, MD   amLODIPine (Norvasc) 5 mg tablet Take 1 tablet (5 mg) by mouth once daily. 9/6/24 9/6/24  Jennifer Anaya MD MPH   apixaban (Eliquis) 5 mg tablet Take 1 tablet (5 mg) by mouth twice a day. 8/16/23 9/6/24  Historical Provider, MD   ARIPiprazole (Abilify) 5 mg tablet Take 1 tablet (5 mg) by mouth once daily. 12/30/22 9/6/24  Historical Provider, MD   atorvastatin (Lipitor) 80 mg tablet Take 1 tablet (80 mg) by mouth once daily at bedtime. 4/18/24 9/6/24  Historical Provider, MD   buPROPion XL (Wellbutrin XL) 150 mg 24 hr tablet Take 2 tablets (300 mg) by mouth once daily. Do not crush, chew, or split.  9/6/24  Historical Provider, MD   carvedilol (Coreg) 12.5 mg tablet Take 1 tablet (12.5 mg) by mouth 2 times daily (morning and late afternoon). 7/18/24 9/6/24  Historical Provider, MD   clopidogrel (Plavix) 75 mg tablet Take 1 tablet (75 mg) by mouth once daily. 7/18/24 9/6/24  Historical  Provider, MD   empagliflozin (Jardiance) 10 mg Take 1 tablet (10 mg) by mouth once daily. 8/8/24 9/6/24  Historical Provider, MD   ezetimibe (Zetia) 10 mg tablet Take 1 tablet (10 mg) by mouth once daily.  9/6/24  Historical Provider, MD   isosorbide mononitrate ER (Imdur) 120 mg 24 hr tablet Take 1 tablet (120 mg) by mouth once daily. 8/9/24 9/5/24  Historical Provider, MD   isosorbide mononitrate ER (Imdur) 30 mg 24 hr tablet Take 3 tablets (90 mg) by mouth once daily. 8/15/24 9/6/24  Historical Provider, MD   lisinopril 10 mg tablet Take 1 tablet (10 mg) by mouth 2 times a day. 9/6/24 9/6/24  Jennifer Anaya MD MPH   lisinopril 20 mg tablet Take 0.5 tablets (10 mg) by mouth once daily.  9/6/24  Historical Provider, MD   lisinopril 20 mg tablet Take 0.5 tablets (10 mg) by mouth once daily. 9/6/24 9/6/24  Jennifer Anaya MD MPH   pantoprazole (ProtoNix) 40 mg EC tablet Take 1 tablet (40 mg) by mouth once daily. 8/8/24 9/6/24  Historical Provider, MD       Medications  Scheduled medications  amLODIPine, 2.5 mg, oral, Daily  apixaban, 5 mg, oral, BID  ARIPiprazole, 5 mg, oral, Daily  atorvastatin, 80 mg, oral, Nightly  buPROPion XL, 300 mg, oral, Daily  carvedilol, 12.5 mg, oral, BID  clopidogrel, 75 mg, oral, Daily  [Held by provider] empagliflozin, 10 mg, oral, Daily  ezetimibe, 10 mg, oral, Daily  influenza, 0.5 mL, intramuscular, During hospitalization  [START ON 9/8/2024] insulin lispro, 0-5 Units, subcutaneous, TID  isosorbide mononitrate ER, 90 mg, oral, Daily  lisinopril, 10 mg, oral, Daily  pantoprazole, 40 mg, oral, Daily  polyethylene glycol, 17 g, oral, Daily  topiramate, 50 mg, oral, BID        Continuous medications       PRN medications  PRN medications: dextrose, dextrose, glucagon, glucagon, hydrOXYzine HCL, nitroglycerin     Constitutional: Patient awake and conversational. In no acute distress. Appears stated age  HEENT: sclerae anicteric, EOM grossly intact. PERRL  CV: RRR, no murmurs. Tender to  palpation over mid sternum. No JVD.   Pulm: CTAB, no increased WOB, no supplementary oxygen. No crackles or wheezing  GI: abd soft, Non Tender, Non Distended.   Skin: warm and dry  Ext: Right LE without pitting edema.  Left below the knee amputation. Left arm medially rotated and flexed at elbow. Left hand clenched. (Baseline per patient) .    Neuro: alert and conversant, No obvious focal neurological deficits  Psych: Appropriate mood and affect.         Assessment & Plan  48 year old male with a history CAD s/p FADY (May and July 2024) c/b pericarditis with pericardial effusion (on colchicine) , DVT (Eliquis), CVA with residual left-sided weakness, PAD s/p BKA, CKD, T2DM, polysubstance presenting with chest pain. Patient was recently discharged on 9/6 for similar presentation. Chest pain was attributed to cocaine use at that time given negative troponins, normal EKG, and negative CXR. He presents for this admission with similar chest pain that is now constant with musculoskeletal characteristics that was not relieved with nitroglycerin and moderately relieved with morphine. He was admitted to the Cardiology service due to his recent hospitalization and ACS rule out.    #Chest pain  #CAD s/p FADY to LM-LAD and Lcx  - pain likely has a musculoskeletal component due to tenderness on palpation in the mid sternum and reproducable.  - Troponin wnl and downtrending 16>14  - BNP 80  -EKG no acute ischemic changes  - CXR no acute cardiopulmonary process  - TTE on 9/5: EF 35-40%. global hypokinesis of the left ventricle with minor regional variations.  -LHC in 05/2024 and 07/2024. He had FADY to LM-LAD and LCx in 05/2024 at [facility]. He again had another LHC in 07/2024 which confirmed patent stents but showed 99% occluded Septal with collaterals from RPDA  - Utox positive for opiods, fentanyl, cocaine  - received multiple nitro sublingual in ED without resolution of pain  - was given morphine 4mg with moderate  improvement in pain  Plan:  -IV tylenol for chest pain  -continue Plavix 75mg for recent drug-eluting stents   -continue Imdur 90 mg   -continue home atorvastatin 80mg and Zetia 10mg  -continue home Coreg 12.5mg , Lisinopril 10mg,   -Holding home Jardiance 10mg iso WILLIAM      #PAD (peripheral artery disease) (HCC)  #Hx of BKA, left (HCC)  -chronic. stable.   -continue home Plavix and statin     #HTN   -continue home amlodipine 2.5 mg  -continue home coreg 12.5 mg       #Hyperlipidemia  -chronic. stable. continue home statin and Zetia      #History of venous thromboembolism  -chronic. stable. continue home Eliquis 5 mg     #Type 2 diabetes mellitus   HbA1c 6.2 on 9/5/24  -continue home empagliflozin,   - SSI while inpatient  -hypoglycemia protocol     #Stage 3b chronic kidney disease (HCC)  -chronic. stable.   -Avoid nephrotoxins, contrast if possible  -Renally dose medications     #Depression / Anxiety  - continue home Abilify 5mg     #Chronic Migraines  - continue home topamax 50 mg      F : PRN  E: PRN  N: cardiac diet    DVT prophylaxis: Eliquis     Code Status: Full Code (confirmed on admission)        Patient to be seen and staffed with attending provider in morning.     Benedict Arellano MD  PGY 1 Internal Medicine

## 2024-09-07 NOTE — ED PROVIDER NOTES
History of Present Illness     History provided by: Patient  Limitations to History: None  External Records Reviewed with Brief Summary:   - 9/5/24-9/6/24 admitted to cardiology. Presented with chest pain. Utox positive for cocaine. Echo revealed LVEF 35-40%. Trop negative, CXR negative, EKG sinus tachycardia, BNP elevated to 549. Cardiology thought there could be an MSK component to chest pain. Patient discharged with cardiology follow-up in 3 months.   HPI:  Alex Hall is a 48 y.o. male with CAD (s/p stent May and July 2024), ischemic cardiomyopathy, CVA (residual left arm deficit), CKD, PAD (s/p left below knee amputation), post-MI pericarditis (on colchicine), DVT (on Eliquis), active cocaine use, and active tobacco use presenting with chest pain. The patient was discharged yesterday from cardiology but says he came back to the emergency room because his chest pain is back. He has chest pain at baseline but feels it is getting worse. He is worried because his ejection fraction on recent echo was lower than in the past. He describes the chest pain as mid sternal and like someone is pushing on his chest. It is constant and radiates to his left ribcage. He endorses pleuritic chest pain for the past 1 month. He endorses 1 episode of coughing up a blood clot a few days ago. He reports compliance with his Eliquis but states he has not been taking colchicine. The patient states he has cardiology follow up but would like to see someone sooner. He was told he may need a bypass surgery and wants more information. He states he last used cocaine a few days before he was admitted to the hospital. He is currently living at a [facility] shelter as his father has been drinking more and the patient does not want to be around him. He denies headache, nausea, vomiting, diarrhea, constipation, urinary symptoms, or recent illness. Denies alcohol use.     Physical Exam   Triage vitals:  T 36.7 °C (98.1 °F)  HR 82  /70  RR 20   O2 100 % None (Room air)    General: Awake, alert, in no acute distress  Eyes: Gaze conjugate.  No scleral icterus or injection  HENT: Normo-cephalic, atraumatic. No stridor  CV: Regular rate, regular rhythm. Radial pulses 2+ bilaterally. Mildly tender to mid sternal palpation. No neck vein distension. No hepatojugular reflex.   Resp: Breathing non-labored, speaking in full sentences.  Clear to auscultation bilaterally  GI: Soft, non-distended, non-tender. No rebound or guarding.  MSK/Extremities: Left below the knee amputation. Left arm medially rotated and flexed at elbow. Left hand clenched. (Baseline per patient)  Skin: Warm. Appropriate color  Neuro: Alert. Oriented. Face symmetric. Speech is fluent.  Gross strength and sensation intact in right upper and right lower extremities. Patient unable to unclench left arm (baseline per patient).   Psych: Appropriate mood and affect    Medical Decision Making & ED Course   Medical Decision Making and ED course:  48 y.o. male with extensive cardiac history, discharged from cardiology yesterday, presenting with chest pain. Suspicion for pulmonary embolism is low as the patient is on Eliquis, is not hypoxic, and is not tachycardic. Per patient history the chest pain appears to be more chronic. There is low suspicion for ACS as EKG did not reveal STEMI and is similar to previous EKGs. There is low concern for cardiac tamponade as patient is hemodynamically stable, heart sounds are regular, and he has no neck vein distension on physical exam. Initial troponin was 16. BNP is 80, improved from 9/5/24, decreasing suspicion for heart failure exacerbation. CXR revealed no acute cardiopulmonary process. CBC and CMP unremarkable. Patient given colchicine for known history of pericarditis. Patient given sublingual nitroglycerin for chest pain. The patient states he has not been taking his colchicine, which may be contributing to his chronic chest pain. Patient did not receive  "NSAIDs as he is allergic (confirmed allergy with patient). The patient's chest pain may also be due to cocaine use. It is unclear when patient last used cocaine as he initially said \"a few days before I was admitted to the hospital\" and then told nursing he used cocaine 3 days ago. Urine tox was ordered and pending at time of admission. Repeat troponin is pending at time of admission to cardiology. Cardiology consulted and accepted the patient for admission due to recent hospitalization.   ----    Differential diagnoses considered include but are not limited to: ACS, pneumonia, pericarditis, pulmonary embolism, cardiac tamponade, cocaine-induced vasospasms     Social Determinants of Health which Significantly Impact Care: Patient lives at [facility]    EKG Independent Interpretation: EKG interpreted by myself. Please see ED Course for full interpretation.    Independent Result Review and Interpretation: Relevant laboratory and radiographic results were reviewed and independently interpreted by myself.  As necessary, they are commented on in the ED Course.    Chronic conditions affecting the patient's care: As documented above in MDM    The patient was discussed with the following consultants/services: Cardiology and admissions coordinates, accepted for admission to [facility] Considerations: As documented above in MDM      Disposition   Admitted to cardiology [facility] service under Dr. Olivier.     Kraig Jimenez MD PGY-1   Emergency Medicine      Kraig Jimenez MD  Resident  09/07/24 3859    "

## 2024-09-08 VITALS
WEIGHT: 142.42 LBS | TEMPERATURE: 98.6 F | HEIGHT: 70 IN | BODY MASS INDEX: 20.39 KG/M2 | RESPIRATION RATE: 20 BRPM | DIASTOLIC BLOOD PRESSURE: 80 MMHG | HEART RATE: 88 BPM | SYSTOLIC BLOOD PRESSURE: 123 MMHG | OXYGEN SATURATION: 98 %

## 2024-09-08 LAB
ALBUMIN SERPL BCP-MCNC: 3.7 G/DL (ref 3.4–5)
AMPHETAMINES UR QL SCN: ABNORMAL
ANION GAP SERPL CALC-SCNC: 13 MMOL/L (ref 10–20)
BARBITURATES UR QL SCN: ABNORMAL
BENZODIAZ UR QL SCN: ABNORMAL
BUN SERPL-MCNC: 36 MG/DL (ref 6–23)
BZE UR QL SCN: ABNORMAL
CALCIUM SERPL-MCNC: 8.6 MG/DL (ref 8.6–10.6)
CANNABINOIDS UR QL SCN: ABNORMAL
CHLORIDE SERPL-SCNC: 115 MMOL/L (ref 98–107)
CO2 SERPL-SCNC: 18 MMOL/L (ref 21–32)
CREAT SERPL-MCNC: 2.81 MG/DL (ref 0.5–1.3)
EGFRCR SERPLBLD CKD-EPI 2021: 27 ML/MIN/1.73M*2
ERYTHROCYTE [DISTWIDTH] IN BLOOD BY AUTOMATED COUNT: 13.5 % (ref 11.5–14.5)
FENTANYL+NORFENTANYL UR QL SCN: ABNORMAL
GLUCOSE BLD MANUAL STRIP-MCNC: 144 MG/DL (ref 74–99)
GLUCOSE BLD MANUAL STRIP-MCNC: 159 MG/DL (ref 74–99)
GLUCOSE BLD MANUAL STRIP-MCNC: 168 MG/DL (ref 74–99)
GLUCOSE BLD MANUAL STRIP-MCNC: 190 MG/DL (ref 74–99)
GLUCOSE SERPL-MCNC: 131 MG/DL (ref 74–99)
HCT VFR BLD AUTO: 34.2 % (ref 41–52)
HGB BLD-MCNC: 11.2 G/DL (ref 13.5–17.5)
MAGNESIUM SERPL-MCNC: 2.07 MG/DL (ref 1.6–2.4)
MCH RBC QN AUTO: 32.3 PG (ref 26–34)
MCHC RBC AUTO-ENTMCNC: 32.7 G/DL (ref 32–36)
MCV RBC AUTO: 99 FL (ref 80–100)
METHADONE UR QL SCN: ABNORMAL
NRBC BLD-RTO: 0 /100 WBCS (ref 0–0)
OPIATES UR QL SCN: ABNORMAL
OXYCODONE+OXYMORPHONE UR QL SCN: ABNORMAL
PCP UR QL SCN: ABNORMAL
PHOSPHATE SERPL-MCNC: 3.8 MG/DL (ref 2.5–4.9)
PLATELET # BLD AUTO: 135 X10*3/UL (ref 150–450)
POTASSIUM SERPL-SCNC: 4.1 MMOL/L (ref 3.5–5.3)
RBC # BLD AUTO: 3.47 X10*6/UL (ref 4.5–5.9)
SODIUM SERPL-SCNC: 142 MMOL/L (ref 136–145)
WBC # BLD AUTO: 7.4 X10*3/UL (ref 4.4–11.3)

## 2024-09-08 PROCEDURE — 36415 COLL VENOUS BLD VENIPUNCTURE: CPT

## 2024-09-08 PROCEDURE — 82947 ASSAY GLUCOSE BLOOD QUANT: CPT

## 2024-09-08 PROCEDURE — 85027 COMPLETE CBC AUTOMATED: CPT

## 2024-09-08 PROCEDURE — 80307 DRUG TEST PRSMV CHEM ANLYZR: CPT

## 2024-09-08 PROCEDURE — 84100 ASSAY OF PHOSPHORUS: CPT

## 2024-09-08 PROCEDURE — 2500000002 HC RX 250 W HCPCS SELF ADMINISTERED DRUGS (ALT 637 FOR MEDICARE OP, ALT 636 FOR OP/ED)

## 2024-09-08 PROCEDURE — 99233 SBSQ HOSP IP/OBS HIGH 50: CPT

## 2024-09-08 PROCEDURE — 1200000002 HC GENERAL ROOM WITH TELEMETRY DAILY

## 2024-09-08 PROCEDURE — 83735 ASSAY OF MAGNESIUM: CPT

## 2024-09-08 PROCEDURE — 2500000001 HC RX 250 WO HCPCS SELF ADMINISTERED DRUGS (ALT 637 FOR MEDICARE OP)

## 2024-09-08 RX ORDER — ACETAMINOPHEN 500 MG
5 TABLET ORAL ONCE
Status: COMPLETED | OUTPATIENT
Start: 2024-09-08 | End: 2024-09-08

## 2024-09-08 RX ORDER — ACETAMINOPHEN 325 MG/1
975 TABLET ORAL ONCE
Status: COMPLETED | OUTPATIENT
Start: 2024-09-08 | End: 2024-09-08

## 2024-09-08 RX ORDER — ACETAMINOPHEN 325 MG/1
650 TABLET ORAL EVERY 6 HOURS PRN
Status: DISPENSED | OUTPATIENT
Start: 2024-09-08

## 2024-09-08 RX ADMIN — ACETAMINOPHEN 975 MG: 325 TABLET ORAL at 04:07

## 2024-09-08 RX ADMIN — ACETAMINOPHEN 975 MG: 325 TABLET ORAL at 11:42

## 2024-09-08 RX ADMIN — INSULIN LISPRO 1 UNITS: 100 INJECTION, SOLUTION INTRAVENOUS; SUBCUTANEOUS at 08:47

## 2024-09-08 RX ADMIN — TOPIRAMATE 50 MG: 50 TABLET, FILM COATED ORAL at 08:46

## 2024-09-08 RX ADMIN — BUPROPION HYDROCHLORIDE 300 MG: 150 TABLET, EXTENDED RELEASE ORAL at 08:46

## 2024-09-08 RX ADMIN — CARVEDILOL 12.5 MG: 12.5 TABLET, FILM COATED ORAL at 08:46

## 2024-09-08 RX ADMIN — PANTOPRAZOLE SODIUM 40 MG: 40 TABLET, DELAYED RELEASE ORAL at 06:02

## 2024-09-08 RX ADMIN — LISINOPRIL 10 MG: 10 TABLET ORAL at 08:46

## 2024-09-08 RX ADMIN — ACETAMINOPHEN 650 MG: 325 TABLET ORAL at 17:26

## 2024-09-08 RX ADMIN — ATORVASTATIN CALCIUM 80 MG: 80 TABLET, FILM COATED ORAL at 20:42

## 2024-09-08 RX ADMIN — INSULIN LISPRO 1 UNITS: 100 INJECTION, SOLUTION INTRAVENOUS; SUBCUTANEOUS at 18:47

## 2024-09-08 RX ADMIN — Medication 5 MG: at 21:39

## 2024-09-08 RX ADMIN — ISOSORBIDE MONONITRATE 90 MG: 60 TABLET, EXTENDED RELEASE ORAL at 08:49

## 2024-09-08 RX ADMIN — HYDROXYZINE HYDROCHLORIDE 25 MG: 25 TABLET ORAL at 20:46

## 2024-09-08 RX ADMIN — TOPIRAMATE 50 MG: 50 TABLET, FILM COATED ORAL at 20:43

## 2024-09-08 RX ADMIN — APIXABAN 5 MG: 5 TABLET, FILM COATED ORAL at 08:47

## 2024-09-08 RX ADMIN — CARVEDILOL 12.5 MG: 12.5 TABLET, FILM COATED ORAL at 17:26

## 2024-09-08 RX ADMIN — CLOPIDOGREL BISULFATE 75 MG: 75 TABLET ORAL at 06:02

## 2024-09-08 RX ADMIN — EZETIMIBE 10 MG: 10 TABLET ORAL at 08:46

## 2024-09-08 RX ADMIN — AMLODIPINE BESYLATE 2.5 MG: 2.5 TABLET ORAL at 08:47

## 2024-09-08 RX ADMIN — APIXABAN 5 MG: 5 TABLET, FILM COATED ORAL at 20:42

## 2024-09-08 RX ADMIN — ARIPIPRAZOLE 5 MG: 5 TABLET ORAL at 08:46

## 2024-09-08 ASSESSMENT — COGNITIVE AND FUNCTIONAL STATUS - GENERAL
MOBILITY SCORE: 24
DAILY ACTIVITIY SCORE: 24
MOBILITY SCORE: 24
DAILY ACTIVITIY SCORE: 24

## 2024-09-08 ASSESSMENT — PAIN DESCRIPTION - ORIENTATION: ORIENTATION: MID

## 2024-09-08 ASSESSMENT — PAIN SCALES - GENERAL
PAINLEVEL_OUTOF10: 7
PAINLEVEL_OUTOF10: 5 - MODERATE PAIN
PAINLEVEL_OUTOF10: 1
PAINLEVEL_OUTOF10: 7
PAINLEVEL_OUTOF10: 7

## 2024-09-08 ASSESSMENT — PAIN - FUNCTIONAL ASSESSMENT: PAIN_FUNCTIONAL_ASSESSMENT: 0-10

## 2024-09-08 ASSESSMENT — PAIN DESCRIPTION - LOCATION
LOCATION: HEAD
LOCATION: CHEST

## 2024-09-08 NOTE — CARE PLAN
Patient safe and stable throughout shift. Chest pain lessened throughout shift back to baseline. Complaints of headache, medicated with prn tylenol. Utox sent.      Problem: Pain  Goal: Takes deep breaths with improved pain control throughout the shift  Outcome: Progressing  Goal: Turns in bed with improved pain control throughout the shift  Outcome: Progressing  Goal: Walks with improved pain control throughout the shift  Outcome: Progressing  Goal: Performs ADL's with improved pain control throughout shift  Outcome: Progressing  Goal: Participates in PT with improved pain control throughout the shift  Outcome: Progressing  Goal: Free from opioid side effects throughout the shift  Outcome: Progressing  Goal: Free from acute confusion related to pain meds throughout the shift  Outcome: Progressing

## 2024-09-08 NOTE — PROGRESS NOTES
Alex Hall is a 48 y.o. male on day 1 of admission presenting with Chest pain, unspecified type.      Subjective   No acute complaints at this time. Patient was comfortable appearing as I entered the room.        Objective     Last Recorded Vitals  /73 (Patient Position: Lying)   Pulse 83   Temp 36.9 °C (98.4 °F)   Resp 21   Wt 64.6 kg (142 lb 6.7 oz)   SpO2 97%   Intake/Output last 3 Shifts:    Intake/Output Summary (Last 24 hours) at 9/8/2024 1358  Last data filed at 9/8/2024 1205  Gross per 24 hour   Intake 960 ml   Output 1450 ml   Net -490 ml       Admission Weight  Weight: 65.8 kg (145 lb) (09/07/24 0943)    Daily Weight  09/08/24 : 64.6 kg (142 lb 6.7 oz)    Image Results  XR chest 2 views  Narrative: Interpreted By:  Leoncio Oneal,   STUDY:  XR CHEST 2 VIEWS;  9/7/2024 12:19 pm      INDICATION:  Signs/Symptoms:chest pain.          COMPARISON:  09/05/2024      ACCESSION NUMBER(S):  [id number]      ORDERING CLINICIAN:  BERNARDO PITTMAN      FINDINGS:                  CARDIOMEDIASTINAL SILHOUETTE:  Cardiomediastinal silhouette is normal in size and configuration.      LUNGS:  Lungs are clear.      ABDOMEN:  No remarkable upper abdominal findings.      BONES:  No acute osseous changes.      Impression: 1.  No evidence of acute cardiopulmonary process.              MACRO:  None      Signed by: Leoncio Oneal 9/7/2024 1:07 PM  Dictation workstation:   [id number]  ECG 12 Lead  Normal sinus rhythm  Prolonged QT  Abnormal ECG  When compared with ECG of 17-MAR-2009 07:44,  Non-specific change in ST segment in Anterior leads  T wave inversion now evident in Lateral leads      Physical Exam  Constitutional:       General: He is not in acute distress.  Cardiovascular:      Rate and Rhythm: Normal rate and regular rhythm.      Heart sounds: No murmur heard.     No friction rub. No gallop.   Pulmonary:      Effort: Pulmonary effort is normal. No respiratory distress.      Breath sounds: Normal breath  sounds. No wheezing.   Abdominal:      General: There is no distension.      Tenderness: There is no abdominal tenderness.   Musculoskeletal:      Comments: Patient without right lower extremity swelling. LLE BKA.    Neurological:      Mental Status: He is alert.     Relevant Results               Assessment/Plan        Assessment & Plan  48 year old male with a history CAD s/p FADY (May and July 2024) c/b pericarditis with pericardial effusion (on colchicine) , DVT (Eliquis), CVA with residual left-sided weakness, PAD s/p BKA, CKD, T2DM, polysubstance presenting with chest pain. Patient was recently discharged on 9/6 for similar presentation. Chest pain was attributed to cocaine use at that time given negative troponins, normal EKG, and negative CXR. He presents for this admission with similar chest pain that is now constant with musculoskeletal characteristics that was not relieved with nitroglycerin and moderately relieved with morphine. He was admitted to the Cardiology service due to his recent hospitalization and ACS rule out.      #Chest pain  #CAD s/p FADY to LM-LAD and Lcx  - pain likely has a musculoskeletal component due to tenderness on palpation in the mid sternum and reproducable.  - Troponin wnl and downtrending 16>14  - BNP 80  -EKG no acute ischemic changes  - CXR no acute cardiopulmonary process  - TTE on 9/5: EF 35-40%. global hypokinesis of the left ventricle with minor regional variations.  -LHC in 05/2024 and 07/2024. He had FADY to LM-LAD and LCx in 05/2024 at [facility]. He again had another LHC in 07/2024 which confirmed patent stents but showed 99% occluded Septal with collaterals from RPDA  - Utox positive for opiods, fentanyl, cocaine  - received multiple nitro sublingual in ED without resolution of pain  - was given morphine 4mg with moderate improvement in pain  Plan:  -IV tylenol for chest pain  -continue Plavix 75mg for recent drug-eluting stents   -continue Imdur 90 mg    -continue home atorvastatin 80mg and Zetia 10mg  -continue home Coreg 12.5mg , Lisinopril 10mg,   -Holding home Jardiance 10mg iso WILLIAM        #PAD (peripheral artery disease) (HCC)  #Hx of BKA, left (HCC)  -chronic. stable.   -continue home Plavix and statin      #HTN   -continue home amlodipine 2.5 mg  -continue home coreg 12.5 mg        #Hyperlipidemia  -chronic. stable. continue home statin and Zetia      #History of venous thromboembolism  -chronic. stable. continue home Eliquis 5 mg     #Type 2 diabetes mellitus   HbA1c 6.2 on 9/5/24  -continue home empagliflozin,   - SSI while inpatient  -hypoglycemia protocol     #Stage 3b chronic kidney disease (HCC)  -chronic. stable.   -Avoid nephrotoxins, contrast if possible  -Renally dose medications      #Depression / Anxiety  - continue home Abilify 5mg      #Chronic Migraines  - continue home topamax 50 mg        F : PRN  E: PRN  N: cardiac diet     DVT prophylaxis: Eliquis      Code Status: Full Code (confirmed on admission)      Patient to be seen and staffed with attending provider in morning.           Jazmyn Kirk MD  PGY 1 Internal Medicine

## 2024-09-08 NOTE — SIGNIFICANT EVENT
HPI:  Alex Hall is a 48 y.o. male  48 y.o. male with CAD s/p FADY (May and July 2024), post MI pericarditis with pericardial effusion (s/p 3 months of colchicine) , DVT (Eliquis), CVA with residual left-sided weakness, PAD s/p L BKA, CKD, T2DM, polysubstance use presents to  ED with chief complaint of chest pressure.    Patient was admitted for chest pain on 9/5-9/6/2024 at which at that time he was extensively worked up for etiology of his chest pain. His ED course at that time, he was found to be tachycardic, and hypertensive (162/103) and at that time was given 2 sublingual nitroglycerin and morphine which improved his pain. Troponin's were negative, CXR unremarkable, and  EKG noted for  sinus tachycardia. BNP elevated at 549 however he was Euvolemic on exam. Etiology of chest pain was likely 2/2 cocaine use with +UDS. He was started on amlodipine for anginal pain which improved his symptoms. He was started on GDMT to improve his EF which was moderately reduced admission TTE.     Patient now presents with acute onset chest pain last evening, with radiation to left ribs. Describes chest discomfort as pressure like and is not improved with rest nor exacerbated with exertions. He denies diaphoresis, tachycardia, palpitation, SOB and nausea. He denies cocaine use since discharge or other illicit drugs. Patient resting comfortably in bed and on RA.    In the ED, VSS, initial labs wnl, no troponin and BNP elevation. EKG similar to previous admission and CXR unremarkable. He was given morphine which improved his pain, however nitroglycerin did not.       Physical Exam:   Constitutional: No acute distress, resting comfortably in bed, cooperative  HEENT: Normocephalic, atraumatic, PERRLA, EOMI, moist mucous membranes, no pharyngeal erythema  Cardiovascular: RRR, normal S1/S2, no murmurs noted. Substernal tenderness to palpation.  Pulmonary: Clear to auscultation b/l, no wheezes/crackles/rhonchi, no increased work of  breathing, no supplemental oxygen  GI: Soft, non-tender, non-distended, normoactive bowel sounds  : No weiner catheter  Lower extremities: Warm and well perfused, no lower extremity edema  Neuro: A&O x3, able to move all 4 extremities spontaneously (s/p L BKA)  Psych: Appropriate mood and affect       Assessment and Plan:  Alex Hall is a 48 y.o. male  48 y.o. male with CAD s/p FADY (May and July 2024), post MI pericarditis with pericardial effusion (s/p 3 months of colchicine) , DVT (Eliquis), CVA with residual left-sided weakness, PAD s/p L BKA, CKD, T2DM, polysubstance use presents with acute onset chest pain with radiation to left ribs, initial labs w/o troponin and BNP elevation. EKG without ischemic changes and patient HDS and on room air.      #Atypical Chest Pain  #CAD s/p FADY to LM-LAD and Lcx   Acute onset CP that initially started last evening with radiation to left ribs. Chest pressure not improved w/rest and not exacerbated with exertion. Endorses substernal tenderness otherwise denies SOB, palpitations, and diaphoresis. His chest less likely ACS given normal ECG and negative troponin, and there is also no evidence of infection given he is afebrile without leukocytosis.  -s/p morphine and nitroglycerin in ED. Pain only relieved by morphine.  Plan:  -IV Tylenol for MSK pain.   -c/w Plavix 75mg for recent drug-eluting stents   -c/w Imdur 90 mg, coreg 12.5mg BID , Lisinopril 10mg,   -c/w home atorvastatin 80mg and Zetia 10mg  -Holding home Jardiance 10mg iso WILLIAM          Please see Dr Mcmullen's note for more detail.

## 2024-09-08 NOTE — CARE PLAN
The clinical goals for the shift include Patient's pain will be controlled    Problem: Pain  Goal: Takes deep breaths with improved pain control throughout the shift  Outcome: Progressing  Goal: Turns in bed with improved pain control throughout the shift  Outcome: Progressing

## 2024-09-09 PROBLEM — R07.9 CHEST PAIN: Status: ACTIVE | Noted: 2024-09-09

## 2024-09-09 LAB
ALBUMIN SERPL BCP-MCNC: 4 G/DL (ref 3.4–5)
ANION GAP SERPL CALC-SCNC: 13 MMOL/L (ref 10–20)
BUN SERPL-MCNC: 28 MG/DL (ref 6–23)
CALCIUM SERPL-MCNC: 9.1 MG/DL (ref 8.6–10.6)
CHLORIDE SERPL-SCNC: 111 MMOL/L (ref 98–107)
CO2 SERPL-SCNC: 18 MMOL/L (ref 21–32)
CREAT SERPL-MCNC: 2.12 MG/DL (ref 0.5–1.3)
EGFRCR SERPLBLD CKD-EPI 2021: 37 ML/MIN/1.73M*2
ERYTHROCYTE [DISTWIDTH] IN BLOOD BY AUTOMATED COUNT: 13.2 % (ref 11.5–14.5)
GLUCOSE BLD MANUAL STRIP-MCNC: 118 MG/DL (ref 74–99)
GLUCOSE BLD MANUAL STRIP-MCNC: 136 MG/DL (ref 74–99)
GLUCOSE BLD MANUAL STRIP-MCNC: 157 MG/DL (ref 74–99)
GLUCOSE BLD MANUAL STRIP-MCNC: 170 MG/DL (ref 74–99)
GLUCOSE SERPL-MCNC: 111 MG/DL (ref 74–99)
HCT VFR BLD AUTO: 37.8 % (ref 41–52)
HGB BLD-MCNC: 12.1 G/DL (ref 13.5–17.5)
MAGNESIUM SERPL-MCNC: 2.2 MG/DL (ref 1.6–2.4)
MCH RBC QN AUTO: 32.4 PG (ref 26–34)
MCHC RBC AUTO-ENTMCNC: 32 G/DL (ref 32–36)
MCV RBC AUTO: 101 FL (ref 80–100)
NRBC BLD-RTO: 0 /100 WBCS (ref 0–0)
PHOSPHATE SERPL-MCNC: 3 MG/DL (ref 2.5–4.9)
PLATELET # BLD AUTO: 168 X10*3/UL (ref 150–450)
POTASSIUM SERPL-SCNC: 4.1 MMOL/L (ref 3.5–5.3)
RBC # BLD AUTO: 3.74 X10*6/UL (ref 4.5–5.9)
SODIUM SERPL-SCNC: 138 MMOL/L (ref 136–145)
WBC # BLD AUTO: 8.6 X10*3/UL (ref 4.4–11.3)

## 2024-09-09 PROCEDURE — 2500000001 HC RX 250 WO HCPCS SELF ADMINISTERED DRUGS (ALT 637 FOR MEDICARE OP): Mod: SE

## 2024-09-09 PROCEDURE — 83735 ASSAY OF MAGNESIUM: CPT

## 2024-09-09 PROCEDURE — 2500000004 HC RX 250 GENERAL PHARMACY W/ HCPCS (ALT 636 FOR OP/ED)

## 2024-09-09 PROCEDURE — S4991 NICOTINE PATCH NONLEGEND: HCPCS | Mod: SE

## 2024-09-09 PROCEDURE — 2500000002 HC RX 250 W HCPCS SELF ADMINISTERED DRUGS (ALT 637 FOR MEDICARE OP, ALT 636 FOR OP/ED): Mod: SE

## 2024-09-09 PROCEDURE — 2500000005 HC RX 250 GENERAL PHARMACY W/O HCPCS

## 2024-09-09 PROCEDURE — 84100 ASSAY OF PHOSPHORUS: CPT

## 2024-09-09 PROCEDURE — 99222 1ST HOSP IP/OBS MODERATE 55: CPT

## 2024-09-09 PROCEDURE — 2500000001 HC RX 250 WO HCPCS SELF ADMINISTERED DRUGS (ALT 637 FOR MEDICARE OP)

## 2024-09-09 PROCEDURE — 36415 COLL VENOUS BLD VENIPUNCTURE: CPT

## 2024-09-09 PROCEDURE — G0378 HOSPITAL OBSERVATION PER HR: HCPCS

## 2024-09-09 PROCEDURE — 85027 COMPLETE CBC AUTOMATED: CPT

## 2024-09-09 PROCEDURE — 82947 ASSAY GLUCOSE BLOOD QUANT: CPT

## 2024-09-09 RX ORDER — LIDOCAINE 560 MG/1
1 PATCH PERCUTANEOUS; TOPICAL; TRANSDERMAL EVERY 24 HOURS
Status: DISCONTINUED | OUTPATIENT
Start: 2024-09-09 | End: 2024-09-10 | Stop reason: HOSPADM

## 2024-09-09 RX ORDER — IBUPROFEN 200 MG
1 TABLET ORAL DAILY
Status: DISCONTINUED | OUTPATIENT
Start: 2024-09-09 | End: 2024-09-10 | Stop reason: HOSPADM

## 2024-09-09 RX ORDER — QUETIAPINE FUMARATE 25 MG/1
25 TABLET, FILM COATED ORAL NIGHTLY
Status: DISCONTINUED | OUTPATIENT
Start: 2024-09-09 | End: 2024-09-10 | Stop reason: HOSPADM

## 2024-09-09 RX ORDER — METHOCARBAMOL 500 MG/1
500 TABLET, FILM COATED ORAL EVERY 6 HOURS SCHEDULED
Status: DISCONTINUED | OUTPATIENT
Start: 2024-09-09 | End: 2024-09-10 | Stop reason: HOSPADM

## 2024-09-09 RX ORDER — LIDOCAINE 560 MG/1
1 PATCH PERCUTANEOUS; TOPICAL; TRANSDERMAL EVERY 24 HOURS
Qty: 7 PATCH | Refills: 0 | Status: SHIPPED | OUTPATIENT
Start: 2024-09-10 | End: 2024-09-10

## 2024-09-09 RX ORDER — ACETAMINOPHEN 325 MG/1
975 TABLET ORAL EVERY 8 HOURS
Status: DISCONTINUED | OUTPATIENT
Start: 2024-09-09 | End: 2024-09-10 | Stop reason: HOSPADM

## 2024-09-09 RX ORDER — ACETAMINOPHEN 500 MG
5 TABLET ORAL NIGHTLY PRN
Status: DISCONTINUED | OUTPATIENT
Start: 2024-09-09 | End: 2024-09-10 | Stop reason: HOSPADM

## 2024-09-09 RX ADMIN — CARVEDILOL 12.5 MG: 12.5 TABLET, FILM COATED ORAL at 16:42

## 2024-09-09 RX ADMIN — TOPIRAMATE 50 MG: 50 TABLET, FILM COATED ORAL at 08:13

## 2024-09-09 RX ADMIN — INSULIN LISPRO 1 UNITS: 100 INJECTION, SOLUTION INTRAVENOUS; SUBCUTANEOUS at 16:35

## 2024-09-09 RX ADMIN — APIXABAN 5 MG: 5 TABLET, FILM COATED ORAL at 08:13

## 2024-09-09 RX ADMIN — CLOPIDOGREL BISULFATE 75 MG: 75 TABLET ORAL at 05:50

## 2024-09-09 RX ADMIN — PANTOPRAZOLE SODIUM 40 MG: 40 TABLET, DELAYED RELEASE ORAL at 05:50

## 2024-09-09 RX ADMIN — AMLODIPINE BESYLATE 2.5 MG: 2.5 TABLET ORAL at 08:13

## 2024-09-09 RX ADMIN — NITROGLYCERIN 0.4 MG: 0.4 TABLET SUBLINGUAL at 23:10

## 2024-09-09 RX ADMIN — NICOTINE 1 PATCH: 14 PATCH, EXTENDED RELEASE TRANSDERMAL at 19:13

## 2024-09-09 RX ADMIN — APIXABAN 5 MG: 5 TABLET, FILM COATED ORAL at 20:06

## 2024-09-09 RX ADMIN — ACETAMINOPHEN 650 MG: 325 TABLET ORAL at 14:10

## 2024-09-09 RX ADMIN — QUETIAPINE FUMARATE 25 MG: 25 TABLET ORAL at 21:29

## 2024-09-09 RX ADMIN — TOPIRAMATE 50 MG: 50 TABLET, FILM COATED ORAL at 21:52

## 2024-09-09 RX ADMIN — NITROGLYCERIN 0.4 MG: 0.4 TABLET SUBLINGUAL at 23:16

## 2024-09-09 RX ADMIN — EZETIMIBE 10 MG: 10 TABLET ORAL at 08:16

## 2024-09-09 RX ADMIN — BUPROPION HYDROCHLORIDE 300 MG: 150 TABLET, EXTENDED RELEASE ORAL at 08:16

## 2024-09-09 RX ADMIN — HYDROXYZINE HYDROCHLORIDE 25 MG: 25 TABLET ORAL at 20:06

## 2024-09-09 RX ADMIN — METHOCARBAMOL 500 MG: 500 TABLET ORAL at 21:29

## 2024-09-09 RX ADMIN — LISINOPRIL 10 MG: 10 TABLET ORAL at 08:13

## 2024-09-09 RX ADMIN — LIDOCAINE 1 PATCH: 4 PATCH TOPICAL at 12:03

## 2024-09-09 RX ADMIN — NITROGLYCERIN 0.4 MG: 0.4 TABLET SUBLINGUAL at 23:02

## 2024-09-09 RX ADMIN — CARVEDILOL 12.5 MG: 12.5 TABLET, FILM COATED ORAL at 08:13

## 2024-09-09 RX ADMIN — ACETAMINOPHEN 975 MG: 325 TABLET ORAL at 20:06

## 2024-09-09 RX ADMIN — Medication 5 MG: at 21:29

## 2024-09-09 RX ADMIN — ACETAMINOPHEN 650 MG: 325 TABLET ORAL at 08:14

## 2024-09-09 RX ADMIN — ARIPIPRAZOLE 5 MG: 5 TABLET ORAL at 08:14

## 2024-09-09 RX ADMIN — ATORVASTATIN CALCIUM 80 MG: 80 TABLET, FILM COATED ORAL at 20:06

## 2024-09-09 RX ADMIN — POLYETHYLENE GLYCOL 3350 17 G: 17 POWDER, FOR SOLUTION ORAL at 12:08

## 2024-09-09 RX ADMIN — ISOSORBIDE MONONITRATE 90 MG: 60 TABLET, EXTENDED RELEASE ORAL at 08:13

## 2024-09-09 ASSESSMENT — PAIN SCALES - GENERAL
PAINLEVEL_OUTOF10: 3
PAINLEVEL_OUTOF10: 3
PAINLEVEL_OUTOF10: 5 - MODERATE PAIN
PAINLEVEL_OUTOF10: 3
PAINLEVEL_OUTOF10: 1

## 2024-09-09 ASSESSMENT — COGNITIVE AND FUNCTIONAL STATUS - GENERAL
DAILY ACTIVITIY SCORE: 24
MOBILITY SCORE: 24
MOBILITY SCORE: 24
DAILY ACTIVITIY SCORE: 24

## 2024-09-09 ASSESSMENT — PAIN - FUNCTIONAL ASSESSMENT
PAIN_FUNCTIONAL_ASSESSMENT: 0-10

## 2024-09-09 ASSESSMENT — PAIN DESCRIPTION - DESCRIPTORS
DESCRIPTORS: ACHING
DESCRIPTORS: ACHING

## 2024-09-09 ASSESSMENT — PAIN DESCRIPTION - LOCATION: LOCATION: CHEST

## 2024-09-09 NOTE — CARE PLAN
Problem: Pain  Goal: Takes deep breaths with improved pain control throughout the shift  Outcome: Progressing     Problem: Safety - Adult  Goal: Free from fall injury  Outcome: Progressing     Problem: Chronic Conditions and Co-morbidities  Goal: Patient's chronic conditions and co-morbidity symptoms are monitored and maintained or improved  Outcome: Progressing     Patient remained stable. Lidocaine patch initiated for chest pain. Continue with PRN tylenol.  saw patient. Patient agreeable to drug rehab at time of discharge. Pending PT/OT.
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
## 2024-09-09 NOTE — NURSING NOTE
GERA provided patient with information regarding drug rehab. Patient contacted rehab. Per patient, rehab requested we contact them @ [phone] at time of discharge so they can arrange an Uber to  the patient. Point of contact at drug rehab is Malik who stated MD needs to fax medical clearance to [phone] when patient medically ready for discharge. Dr. Mendez of [facility] notified.    Valery Heart RN

## 2024-09-09 NOTE — DISCHARGE INSTRUCTIONS
Dear Alex Hall,    You were admitted to [facility] from the shelter to the cardiology medicine service for chest pain. You were also recently discharged from the hospital for a similar chest pain.  Based on your blood labs and imaging, we feel assured that your are not experiencing a heart attack or any other life threatening condition. Your chest pain is most likely secondary to a particularly heart artery spasm because of cocaine use.  Hence in order to prevent this pain again, we strongly recommend abstinence from this substance. We felt that you were medically optimized for discharge from the hospital. However, we did ask physical therapy and occupational therapy to see you before you leave to make sure you are physically fit for discharge. In addition, we dicussed the option of you going to rehab. We highly recommend do you this after you collect your belongings a the [facility]. You can find their number bellow     Medication changes:  Added Lidocaine patch for chest pain  Tylenol for chest pain    Imdur once daily   Amlodipine 2.5 mg daily   Restarted your jardiance before discharge     Appointments/follow-up:  Please schedule a follow up visit with your primary care doctor and/or cardiologist   [facility] at [phone] once you have your belongings     It was a pleasure taking care of you,    Your  Care Team

## 2024-09-09 NOTE — PROGRESS NOTES
Alex Hall is a 48 y.o. male on day 2 of admission presenting with Chest pain, unspecified type.      Subjective   No acute complaints at this time. Patient was comfortable appearing as I entered the room. Reports chest soreness that he has had during throughout current and prior admission. Patient denies Fevers, chills, palpitations, SOB, cough, wheeze, AB pain, n/v/d, hematochezia, hematuria, or dsyuria.        Objective     Last Recorded Vitals  /86   Pulse 98   Temp 36.2 °C (97.2 °F) (Temporal)   Resp 18   Wt 67.8 kg (149 lb 7.6 oz)   SpO2 97%   Intake/Output last 3 Shifts:    Intake/Output Summary (Last 24 hours) at 9/9/2024 1659  Last data filed at 9/9/2024 1300  Gross per 24 hour   Intake 1220 ml   Output 1350 ml   Net -130 ml       Admission Weight  Weight: 65.8 kg (145 lb) (09/07/24 0943)    Daily Weight  09/09/24 : 67.8 kg (149 lb 7.6 oz)    Image Results  XR chest 2 views  Narrative: Interpreted By:  Leoncio Oneal,   STUDY:  XR CHEST 2 VIEWS;  9/7/2024 12:19 pm      INDICATION:  Signs/Symptoms:chest pain.          COMPARISON:  09/05/2024      ACCESSION NUMBER(S):  [id number]      ORDERING CLINICIAN:  BERNARDO PITTMAN      FINDINGS:                  CARDIOMEDIASTINAL SILHOUETTE:  Cardiomediastinal silhouette is normal in size and configuration.      LUNGS:  Lungs are clear.      ABDOMEN:  No remarkable upper abdominal findings.      BONES:  No acute osseous changes.      Impression: 1.  No evidence of acute cardiopulmonary process.              MACRO:  None      Signed by: Leoncio Oneal 9/7/2024 1:07 PM  Dictation workstation:   [id number]  ECG 12 Lead  Normal sinus rhythm  Prolonged QT  Abnormal ECG  When compared with ECG of 17-MAR-2009 07:44,  Non-specific change in ST segment in Anterior leads  T wave inversion now evident in Lateral leads      Physical Exam  Constitutional:       General: He is not in acute distress.  Cardiovascular:      Rate and Rhythm: Normal rate and regular  rhythm.      Heart sounds: No murmur heard.     No friction rub. No gallop.   Pulmonary:      Effort: Pulmonary effort is normal. No respiratory distress.      Breath sounds: Normal breath sounds. No wheezing.   Abdominal:      General: There is no distension.      Tenderness: There is no abdominal tenderness.   Musculoskeletal:      Comments: Patient without right lower extremity swelling. LLE BKA.    Neurological:      Mental Status: He is alert.     Relevant Results               Assessment/Plan        Assessment & Plan  48 year old male with a history CAD s/p FADY (May and July 2024) c/b pericarditis with pericardial effusion (on colchicine) , DVT (Eliquis), CVA with residual left-sided weakness, PAD s/p BKA, CKD, T2DM, polysubstance presenting with chest pain. Patient was recently discharged on 9/6 for similar presentation. Chest pain was attributed to cocaine use at that time given negative troponins, normal EKG, and negative CXR. He presents for this admission with similar chest pain that is now constant with musculoskeletal characteristics that was not relieved with nitroglycerin and moderately relieved with morphine. He was admitted to the Cardiology service due to his recent hospitalization and ACS rule out. Chest pain most likely 2/2 cocaine use.       #Chest pain  #CAD s/p FADY to LM-LAD and Lcx  - pain likely has a musculoskeletal component due to tenderness on palpation in the mid sternum and reproducable.  - Troponin wnl and downtrending 16>14  - BNP 80  -EKG no acute ischemic changes  - CXR no acute cardiopulmonary process  - TTE on 9/5: EF 35-40%. global hypokinesis of the left ventricle with minor regional variations.  -LHC in 05/2024 and 07/2024. He had FADY to LM-LAD and LCx in 05/2024 at [facility]. He again had another LHC in 07/2024 which confirmed patent stents but showed 99% occluded Septal with collaterals from RPDA  - Utox positive for opiods, fentanyl, cocaine  - received multiple  nitro sublingual in ED without resolution of pain  - was given morphine 4mg with moderate improvement in pain  Plan:  -IV tylenol and lidocaine patch for chest pain  -continue Plavix 75mg for recent drug-eluting stents   -continue Imdur 90 mg   -continue home atorvastatin 80mg and Zetia 10mg  -continue home Coreg 12.5mg , Lisinopril 10mg,   -Restarted home Jardiance 10mg iso WILLIAM for AM 9/10        #PAD (peripheral artery disease) (HCC)  #Hx of BKA, left (HCC)  -chronic. stable.   -continue home Plavix and statin      #HTN   -continue home amlodipine 2.5 mg  -continue home coreg 12.5 mg        #Hyperlipidemia  -chronic. stable. continue home statin and Zetia      #History of venous thromboembolism  -chronic. stable. continue home Eliquis 5 mg     #Type 2 diabetes mellitus   HbA1c 6.2 on 9/5/24  -continue home empagliflozin,   - SSI while inpatient  -hypoglycemia protocol     #Stage 3b chronic kidney disease (HCC)  -chronic. stable.   -Avoid nephrotoxins, contrast if possible  -Renally dose medications      #Depression / Anxiety  - continue home Abilify 5mg      #Chronic Migraines  - continue home topamax 50 mg        F : PRN  E: PRN  N: cardiac diet     DVT prophylaxis: Eliquis      Code Status: Full Code (confirmed on admission)      Patient to be seen and staffed with attending provider.         Trever Mendez, DO  PGY 1 Internal Medicine

## 2024-09-10 ENCOUNTER — PHARMACY VISIT (OUTPATIENT)
Dept: PHARMACY | Facility: CLINIC | Age: 49
End: 2024-09-10
Payer: MEDICAID

## 2024-09-10 ENCOUNTER — TELEPHONE (OUTPATIENT)
Dept: SCHEDULING | Age: 49
End: 2024-09-10
Payer: MEDICAID

## 2024-09-10 VITALS
WEIGHT: 149.47 LBS | SYSTOLIC BLOOD PRESSURE: 116 MMHG | OXYGEN SATURATION: 97 % | HEART RATE: 91 BPM | BODY MASS INDEX: 21.4 KG/M2 | RESPIRATION RATE: 18 BRPM | DIASTOLIC BLOOD PRESSURE: 69 MMHG | HEIGHT: 70 IN | TEMPERATURE: 97.7 F

## 2024-09-10 PROBLEM — T87.9 BKA STUMP COMPLICATION (MULTI): Status: ACTIVE | Noted: 2024-09-10

## 2024-09-10 LAB
ALBUMIN SERPL BCP-MCNC: 4.1 G/DL (ref 3.4–5)
ANION GAP SERPL CALC-SCNC: 13 MMOL/L (ref 10–20)
BUN SERPL-MCNC: 34 MG/DL (ref 6–23)
CALCIUM SERPL-MCNC: 9.4 MG/DL (ref 8.6–10.6)
CHLORIDE SERPL-SCNC: 111 MMOL/L (ref 98–107)
CO2 SERPL-SCNC: 18 MMOL/L (ref 21–32)
CREAT SERPL-MCNC: 2.64 MG/DL (ref 0.5–1.3)
EGFRCR SERPLBLD CKD-EPI 2021: 29 ML/MIN/1.73M*2
ERYTHROCYTE [DISTWIDTH] IN BLOOD BY AUTOMATED COUNT: 12.9 % (ref 11.5–14.5)
GLUCOSE BLD MANUAL STRIP-MCNC: 99 MG/DL (ref 74–99)
GLUCOSE SERPL-MCNC: 91 MG/DL (ref 74–99)
HCT VFR BLD AUTO: 39.9 % (ref 41–52)
HGB BLD-MCNC: 12.5 G/DL (ref 13.5–17.5)
MAGNESIUM SERPL-MCNC: 2.45 MG/DL (ref 1.6–2.4)
MCH RBC QN AUTO: 31.7 PG (ref 26–34)
MCHC RBC AUTO-ENTMCNC: 31.3 G/DL (ref 32–36)
MCV RBC AUTO: 101 FL (ref 80–100)
NRBC BLD-RTO: 0 /100 WBCS (ref 0–0)
PHOSPHATE SERPL-MCNC: 3.9 MG/DL (ref 2.5–4.9)
PLATELET # BLD AUTO: 172 X10*3/UL (ref 150–450)
POTASSIUM SERPL-SCNC: 4.2 MMOL/L (ref 3.5–5.3)
RBC # BLD AUTO: 3.94 X10*6/UL (ref 4.5–5.9)
SODIUM SERPL-SCNC: 138 MMOL/L (ref 136–145)
WBC # BLD AUTO: 8.1 X10*3/UL (ref 4.4–11.3)

## 2024-09-10 PROCEDURE — 2500000001 HC RX 250 WO HCPCS SELF ADMINISTERED DRUGS (ALT 637 FOR MEDICARE OP): Mod: SE

## 2024-09-10 PROCEDURE — 83735 ASSAY OF MAGNESIUM: CPT

## 2024-09-10 PROCEDURE — G0378 HOSPITAL OBSERVATION PER HR: HCPCS

## 2024-09-10 PROCEDURE — 2500000002 HC RX 250 W HCPCS SELF ADMINISTERED DRUGS (ALT 637 FOR MEDICARE OP, ALT 636 FOR OP/ED): Mod: SE

## 2024-09-10 PROCEDURE — 85027 COMPLETE CBC AUTOMATED: CPT

## 2024-09-10 PROCEDURE — 82947 ASSAY GLUCOSE BLOOD QUANT: CPT

## 2024-09-10 PROCEDURE — 99239 HOSP IP/OBS DSCHRG MGMT >30: CPT

## 2024-09-10 PROCEDURE — 80069 RENAL FUNCTION PANEL: CPT

## 2024-09-10 PROCEDURE — 36415 COLL VENOUS BLD VENIPUNCTURE: CPT

## 2024-09-10 PROCEDURE — 97161 PT EVAL LOW COMPLEX 20 MIN: CPT | Mod: GP

## 2024-09-10 PROCEDURE — RXMED WILLOW AMBULATORY MEDICATION CHARGE

## 2024-09-10 PROCEDURE — 2500000005 HC RX 250 GENERAL PHARMACY W/O HCPCS: Mod: SE

## 2024-09-10 PROCEDURE — S4991 NICOTINE PATCH NONLEGEND: HCPCS | Mod: SE

## 2024-09-10 RX ORDER — OXYCODONE AND ACETAMINOPHEN 5; 325 MG/1; MG/1
1 TABLET ORAL ONCE
Status: COMPLETED | OUTPATIENT
Start: 2024-09-10 | End: 2024-09-10

## 2024-09-10 RX ORDER — LIDOCAINE 560 MG/1
1 PATCH PERCUTANEOUS; TOPICAL; TRANSDERMAL EVERY 24 HOURS
Qty: 7 PATCH | Refills: 0 | Status: SHIPPED | OUTPATIENT
Start: 2024-09-10

## 2024-09-10 RX ORDER — OXYCODONE AND ACETAMINOPHEN 5; 325 MG/1; MG/1
1 TABLET ORAL DAILY
Qty: 5 TABLET | Refills: 0 | Status: SHIPPED | OUTPATIENT
Start: 2024-09-10

## 2024-09-10 RX ADMIN — APIXABAN 5 MG: 5 TABLET, FILM COATED ORAL at 09:14

## 2024-09-10 RX ADMIN — LIDOCAINE 1 PATCH: 4 PATCH TOPICAL at 11:08

## 2024-09-10 RX ADMIN — TOPIRAMATE 50 MG: 50 TABLET, FILM COATED ORAL at 09:16

## 2024-09-10 RX ADMIN — CARVEDILOL 12.5 MG: 12.5 TABLET, FILM COATED ORAL at 09:14

## 2024-09-10 RX ADMIN — ARIPIPRAZOLE 5 MG: 5 TABLET ORAL at 09:14

## 2024-09-10 RX ADMIN — CLOPIDOGREL BISULFATE 75 MG: 75 TABLET ORAL at 06:21

## 2024-09-10 RX ADMIN — EMPAGLIFLOZIN 10 MG: 10 TABLET, FILM COATED ORAL at 06:21

## 2024-09-10 RX ADMIN — METHOCARBAMOL 500 MG: 500 TABLET ORAL at 06:21

## 2024-09-10 RX ADMIN — METHOCARBAMOL 500 MG: 500 TABLET ORAL at 00:44

## 2024-09-10 RX ADMIN — OXYCODONE HYDROCHLORIDE AND ACETAMINOPHEN 1 TABLET: 5; 325 TABLET ORAL at 10:55

## 2024-09-10 RX ADMIN — LISINOPRIL 10 MG: 10 TABLET ORAL at 09:15

## 2024-09-10 RX ADMIN — ACETAMINOPHEN 975 MG: 325 TABLET ORAL at 04:06

## 2024-09-10 RX ADMIN — NICOTINE 1 PATCH: 14 PATCH, EXTENDED RELEASE TRANSDERMAL at 09:15

## 2024-09-10 RX ADMIN — EZETIMIBE 10 MG: 10 TABLET ORAL at 09:14

## 2024-09-10 RX ADMIN — ISOSORBIDE MONONITRATE 90 MG: 60 TABLET, EXTENDED RELEASE ORAL at 09:15

## 2024-09-10 RX ADMIN — BUPROPION HYDROCHLORIDE 300 MG: 150 TABLET, EXTENDED RELEASE ORAL at 09:14

## 2024-09-10 RX ADMIN — PANTOPRAZOLE SODIUM 40 MG: 40 TABLET, DELAYED RELEASE ORAL at 06:21

## 2024-09-10 RX ADMIN — ACETAMINOPHEN 975 MG: 325 TABLET ORAL at 10:55

## 2024-09-10 RX ADMIN — AMLODIPINE BESYLATE 2.5 MG: 2.5 TABLET ORAL at 09:14

## 2024-09-10 RX ADMIN — METHOCARBAMOL 500 MG: 500 TABLET ORAL at 12:11

## 2024-09-10 ASSESSMENT — COGNITIVE AND FUNCTIONAL STATUS - GENERAL
MOBILITY SCORE: 20
STANDING UP FROM CHAIR USING ARMS: A LITTLE
WALKING IN HOSPITAL ROOM: A LITTLE
CLIMB 3 TO 5 STEPS WITH RAILING: A LITTLE
DAILY ACTIVITIY SCORE: 24
MOVING TO AND FROM BED TO CHAIR: A LITTLE
MOBILITY SCORE: 24

## 2024-09-10 ASSESSMENT — ACTIVITIES OF DAILY LIVING (ADL): ADL_ASSISTANCE: INDEPENDENT

## 2024-09-10 ASSESSMENT — PAIN SCALES - GENERAL
PAINLEVEL_OUTOF10: 7

## 2024-09-10 ASSESSMENT — PAIN - FUNCTIONAL ASSESSMENT: PAIN_FUNCTIONAL_ASSESSMENT: 0-10

## 2024-09-10 NOTE — PROGRESS NOTES
Physical Therapy    Physical Therapy Evaluation    Patient Name: Alex Hall  MRN: [id number]  Today's Date: 9/10/2024   Time Calculation  Start Time: 0939  Stop Time: 0955  Time Calculation (min): 16 min    Assessment/Plan   PT Assessment  PT Assessment Results: Decreased strength, Impaired balance, Decreased mobility  Rehab Prognosis: Good  Barriers to Discharge: homeless  End of Session Communication: Bedside nurse  Assessment Comment: Pt is a 48 yr old male admitted for chest pain. Pt presents with decreased strength, balance, and mobility. Pt would benefit from PT to address the above deficits.  End of Session Patient Position: Alarm off, not on at start of session (seated EOB, medical team present)  IP OR SWING BED PT PLAN  Inpatient or Swing Bed: Inpatient  PT Plan  Treatment/Interventions: Transfer training, Gait training, Balance training, Strengthening  PT Plan: Ongoing PT  PT Frequency: 3 times per week  PT Discharge Recommendations: Low intensity level of continued care  PT Recommended Transfer Status: Assistive device, Stand by assist  PT - OK to Discharge: Yes      Subjective   General Visit Information:  Reason for Referral: chest pain  Past Medical History Relevant to Rehab: CAD s/p FADY (May and July 2024) c/b pericarditis with pericardial effusion (on colchicine) , DVT (Eliquis), CVA with residual left-sided weakness, PAD s/p BKA, CKD, T2DM, polysubstance  Prior to Session Communication: Bedside nurse  Patient Position Received: Bed, 2 rail up, Alarm off, not on at start of session  General Comment: supine in bed, willing to participate   Home Living:  Home Living  Type of Home:  (was living with his father, but is now planning to stay at a shelter)  Home Adaptive Equipment: Cane (LLE prosthesis (stated he needs a new prosthetic))  Prior Level of Function:  Prior Function Per Pt/Caregiver Report  ADL Assistance: Independent  Ambulatory Assistance: Independent (uses a cane and LLE prosthesis; reports  ambulation is limited due to pain from prosthesis)  Prior Function Comments: one recent fall  Precautions:  Precautions  Medical Precautions: Fall precautions       Objective     Pain:  Pain Assessment  Pain Assessment: 0-10  Pain Location:  (left shoulder and LLE (not rated))  Cognition:  Cognition  Overall Cognitive Status: Within Functional Limits      Extremity/Trunk Assessments:  Strength:           RLE   RLE : Within Functional Limits  LLE   LLE : Exceptions to WFL  Strength LLE  LLE Overall Strength:  (hip/knee at least 3/5)    General Assessments:            Sensation  Light Touch:  (decreased light touch LLE/LUE)     Static Sitting Balance  Static Sitting-Level of Assistance: Independent  Dynamic Sitting Balance  Dynamic Sitting-Level of Assistance: Distant supervision  Static Standing Balance  Static Standing-Level of Assistance: Close supervision (with cane)  Dynamic Standing Balance  Dynamic Standing-Level of Assistance: Close supervision (with cane)    Functional Assessments:  Bed Mobility  Bed Mobility: Yes  Bed Mobility 1  Bed Mobility 1: Supine to sitting  Level of Assistance 1: Modified independent  Bed Mobility Comments 1: HOB slightly elevated  Transfers  Transfer: Yes  Transfer 1  Technique 1: Sit to stand, Stand to sit  Transfer Device 1: Cane  Transfer Level of Assistance 1: Close supervision  Ambulation/Gait Training  Ambulation/Gait Training Performed: Yes  Ambulation/Gait Training 1  Device 1: Single point cane  Gait Support Devices: Prosthesis  Assistance 1: Close supervision  Quality of Gait 1: Decreased step length (decreased gustabo)  Comments/Distance (ft) 1: 100 feet          Outcome Measures:  [facility] Basic Mobility  Turning from your back to your side while in a flat bed without using bedrails: None  Moving from lying on your back to sitting on the side of a flat bed without using bedrails: None  Moving to and from bed to chair (including a wheelchair): A little  Standing up from a  chair using your arms (e.g. wheelchair or bedside chair): A little  To walk in hospital room: A little  Climbing 3-5 steps with railing: A little  Basic Mobility - Total Score: 20                               Encounter Problems       Encounter Problems (Active)       Balance       No LOB with transfers/ambulation        Start:  09/10/24    Expected End:  10/01/24               Mobility       STG - Patient will ambulate 150 feet with cane modified independent        Start:  09/10/24    Expected End:  10/01/24               Mobility       Pt will be independent with BLE HEP       Start:  09/10/24    Expected End:  10/01/24               PT Transfers       STG - Patient will transfer sit to and from stand with cane modified independent        Start:  09/10/24    Expected End:  10/01/24               Pain - Adult              Education Documentation  Precautions, taught by Adeola Pina, PT at 9/10/2024 10:37 AM.  Learner: Patient  Readiness: Acceptance  Method: Explanation  Response: Verbalizes Understanding    Mobility Training, taught by Adeola Pina PT at 9/10/2024 10:37 AM.  Learner: Patient  Readiness: Acceptance  Method: Explanation  Response: Verbalizes Understanding    Education Comments  No comments found.            09/10/24 at 10:40 AM   Adeola Pina, PT

## 2024-09-10 NOTE — NURSING NOTE
Patient discharged to [facility] from [facility]5 this morning. Patient was taken off of telemetry monitoring and IV was removed intact. Meds to beds were  delivered. Discharge instructions were given to and reviewed with patient at bedside. Patient was taken off floor in wheelchair to meet ride out front.   Vivian Castanon RN

## 2024-09-17 LAB
ATRIAL RATE: 95 BPM
P AXIS: 80 DEGREES
P OFFSET: 201 MS
P ONSET: 143 MS
PR INTERVAL: 156 MS
Q ONSET: 221 MS
QRS COUNT: 16 BEATS
QRS DURATION: 88 MS
QT INTERVAL: 438 MS
QTC CALCULATION(BAZETT): 550 MS
QTC FREDERICIA: 510 MS
R AXIS: 86 DEGREES
T AXIS: 109 DEGREES
T OFFSET: 440 MS
VENTRICULAR RATE: 95 BPM

## 2024-09-23 ENCOUNTER — APPOINTMENT (OUTPATIENT)
Dept: PRIMARY CARE | Facility: CLINIC | Age: 49
End: 2024-09-23
Payer: MEDICAID

## 2024-11-10 ENCOUNTER — APPOINTMENT (OUTPATIENT)
Dept: CARDIOLOGY | Facility: HOSPITAL | Age: 49
End: 2024-11-10
Payer: MEDICAID

## 2024-11-10 ENCOUNTER — APPOINTMENT (OUTPATIENT)
Dept: RADIOLOGY | Facility: HOSPITAL | Age: 49
End: 2024-11-10
Payer: MEDICAID

## 2024-11-10 ENCOUNTER — HOSPITAL ENCOUNTER (EMERGENCY)
Facility: HOSPITAL | Age: 49
Discharge: LEFT BEFORE DISCHARGE | End: 2024-11-10
Attending: EMERGENCY MEDICINE
Payer: MEDICAID

## 2024-11-10 VITALS
TEMPERATURE: 98.7 F | SYSTOLIC BLOOD PRESSURE: 159 MMHG | HEART RATE: 96 BPM | OXYGEN SATURATION: 98 % | RESPIRATION RATE: 24 BRPM | DIASTOLIC BLOOD PRESSURE: 95 MMHG

## 2024-11-10 DIAGNOSIS — R07.9 CHEST PAIN, UNSPECIFIED TYPE: Primary | ICD-10-CM

## 2024-11-10 LAB
ALBUMIN SERPL BCP-MCNC: 4.6 G/DL (ref 3.4–5)
ALP SERPL-CCNC: 82 U/L (ref 33–120)
ALT SERPL W P-5'-P-CCNC: 14 U/L (ref 10–52)
ANION GAP SERPL CALCULATED.3IONS-SCNC: 16 MMOL/L (ref 10–20)
AST SERPL W P-5'-P-CCNC: 19 U/L (ref 9–39)
BASOPHILS # BLD AUTO: 0.07 X10*3/UL (ref 0–0.1)
BASOPHILS NFR BLD AUTO: 0.7 %
BILIRUB SERPL-MCNC: 0.6 MG/DL (ref 0–1.2)
BUN SERPL-MCNC: 38 MG/DL (ref 6–23)
CALCIUM SERPL-MCNC: 9.5 MG/DL (ref 8.6–10.3)
CARDIAC TROPONIN I PNL SERPL HS: 26 NG/L (ref 0–20)
CHLORIDE SERPL-SCNC: 106 MMOL/L (ref 98–107)
CO2 SERPL-SCNC: 20 MMOL/L (ref 21–32)
CREAT SERPL-MCNC: 2.65 MG/DL (ref 0.5–1.3)
EGFRCR SERPLBLD CKD-EPI 2021: 28 ML/MIN/1.73M*2
EOSINOPHIL # BLD AUTO: 0 X10*3/UL (ref 0–0.7)
EOSINOPHIL NFR BLD AUTO: 0 %
ERYTHROCYTE [DISTWIDTH] IN BLOOD BY AUTOMATED COUNT: 13 % (ref 11.5–14.5)
GLUCOSE SERPL-MCNC: 139 MG/DL (ref 74–99)
HCT VFR BLD AUTO: 36.6 % (ref 41–52)
HGB BLD-MCNC: 11.9 G/DL (ref 13.5–17.5)
IMM GRANULOCYTES # BLD AUTO: 0.04 X10*3/UL (ref 0–0.7)
IMM GRANULOCYTES NFR BLD AUTO: 0.4 % (ref 0–0.9)
LYMPHOCYTES # BLD AUTO: 1.21 X10*3/UL (ref 1.2–4.8)
LYMPHOCYTES NFR BLD AUTO: 11.7 %
MCH RBC QN AUTO: 32.6 PG (ref 26–34)
MCHC RBC AUTO-ENTMCNC: 32.5 G/DL (ref 32–36)
MCV RBC AUTO: 100 FL (ref 80–100)
MONOCYTES # BLD AUTO: 0.71 X10*3/UL (ref 0.1–1)
MONOCYTES NFR BLD AUTO: 6.9 %
NEUTROPHILS # BLD AUTO: 8.29 X10*3/UL (ref 1.2–7.7)
NEUTROPHILS NFR BLD AUTO: 80.3 %
NRBC BLD-RTO: 0 /100 WBCS (ref 0–0)
PLATELET # BLD AUTO: 223 X10*3/UL (ref 150–450)
POTASSIUM SERPL-SCNC: 3.2 MMOL/L (ref 3.5–5.3)
PROT SERPL-MCNC: 8 G/DL (ref 6.4–8.2)
RBC # BLD AUTO: 3.65 X10*6/UL (ref 4.5–5.9)
SODIUM SERPL-SCNC: 139 MMOL/L (ref 136–145)
WBC # BLD AUTO: 10.3 X10*3/UL (ref 4.4–11.3)

## 2024-11-10 PROCEDURE — 93005 ELECTROCARDIOGRAM TRACING: CPT

## 2024-11-10 PROCEDURE — 85025 COMPLETE CBC W/AUTO DIFF WBC: CPT

## 2024-11-10 PROCEDURE — 82310 ASSAY OF CALCIUM: CPT

## 2024-11-10 PROCEDURE — 99283 EMERGENCY DEPT VISIT LOW MDM: CPT | Mod: 25

## 2024-11-10 PROCEDURE — 71045 X-RAY EXAM CHEST 1 VIEW: CPT | Performed by: RADIOLOGY

## 2024-11-10 PROCEDURE — 84484 ASSAY OF TROPONIN QUANT: CPT

## 2024-11-10 PROCEDURE — 36415 COLL VENOUS BLD VENIPUNCTURE: CPT

## 2024-11-10 PROCEDURE — 71045 X-RAY EXAM CHEST 1 VIEW: CPT

## 2024-11-10 ASSESSMENT — LIFESTYLE VARIABLES
HAVE YOU EVER FELT YOU SHOULD CUT DOWN ON YOUR DRINKING: NO
HAVE PEOPLE ANNOYED YOU BY CRITICIZING YOUR DRINKING: NO
EVER HAD A DRINK FIRST THING IN THE MORNING TO STEADY YOUR NERVES TO GET RID OF A HANGOVER: NO
TOTAL SCORE: 0
EVER FELT BAD OR GUILTY ABOUT YOUR DRINKING: NO

## 2024-11-10 ASSESSMENT — PAIN SCALES - GENERAL: PAINLEVEL_OUTOF10: 4

## 2024-11-10 ASSESSMENT — COLUMBIA-SUICIDE SEVERITY RATING SCALE - C-SSRS
6. HAVE YOU EVER DONE ANYTHING, STARTED TO DO ANYTHING, OR PREPARED TO DO ANYTHING TO END YOUR LIFE?: NO
2. HAVE YOU ACTUALLY HAD ANY THOUGHTS OF KILLING YOURSELF?: NO
1. IN THE PAST MONTH, HAVE YOU WISHED YOU WERE DEAD OR WISHED YOU COULD GO TO SLEEP AND NOT WAKE UP?: NO

## 2024-11-10 ASSESSMENT — PAIN DESCRIPTION - LOCATION: LOCATION: CHEST

## 2024-11-10 ASSESSMENT — PAIN - FUNCTIONAL ASSESSMENT: PAIN_FUNCTIONAL_ASSESSMENT: 0-10

## 2024-11-10 NOTE — ED PROVIDER NOTES
"HPI   Chief Complaint   Patient presents with    Chest Pain       HPI  Patient is a 48-year-old male with history of hypertension, CAD presenting for evaluation of chest pain that started few hours prior to arrival.  Patient states the pain is in the middle of his chest and does radiate to his left shoulder.  States it feels like his previous heart attack he had 1 month ago.  He states he did take nitro prior to arrival and he is still having chest pain at this time and is requesting additional pain medication.  He is seen in the ER frequently for similar complaints.  He states he does smoke half a pack a day.  He did report that he used cocaine this morning prior to arrival.  Otherwise denies shortness of breath, recent fever or chills, chest congestion or flulike symptoms.  States that he is on Eliquis for DVT and has been taking that as prescribed.      Patient History   Past Medical History:   Diagnosis Date    Hypertension     NSTEMI (non-ST elevated myocardial infarction) (Multi)     x2 in 2024    Pericarditis ([facility]-HCC)     Stroke (cerebrum) (Multi) 2017     Past Surgical History:   Procedure Laterality Date    HIP SURGERY Left     LEG AMPUTATION Left     bka     No family history on file.  Social History     Tobacco Use    Smoking status: Every Day     Current packs/day: 0.50     Types: Cigarettes    Smokeless tobacco: Never   Substance Use Topics    Alcohol use: Not Currently    Drug use: Not Currently     Types: \"Crack\" cocaine       Physical Exam   ED Triage Vitals   Temp Pulse Resp BP   -- -- -- --      SpO2 Temp src Heart Rate Source Patient Position   -- -- -- --      BP Location FiO2 (%)     -- --       Physical Exam  Vitals and nursing note reviewed.   Constitutional:       General: He is not in acute distress.     Appearance: He is well-developed.      Comments: Somewhat disheveled but otherwise no acute distress resting in exam bed   HENT:      Head: Normocephalic and atraumatic.   Eyes:      " Conjunctiva/sclera: Conjunctivae normal.   Cardiovascular:      Rate and Rhythm: Normal rate and regular rhythm.      Pulses:           Radial pulses are 2+ on the right side and 2+ on the left side.      Heart sounds: No murmur heard.  Pulmonary:      Effort: Pulmonary effort is normal. No respiratory distress.      Breath sounds: Normal breath sounds.   Abdominal:      Palpations: Abdomen is soft.      Tenderness: There is no abdominal tenderness.   Musculoskeletal:         General: No swelling.      Cervical back: Neck supple.      Right lower leg: No edema.      Left lower leg: No edema.   Skin:     General: Skin is warm and dry.      Capillary Refill: Capillary refill takes less than 2 seconds.   Neurological:      Mental Status: He is alert.   Psychiatric:         Mood and Affect: Mood normal.           ED Course & MDM   ED Course as of 11/11/24 1813   Sun Nov 10, 2024   1131 EKG personally interpreted by me performed at 1129  Normal sinus rhythm with ventricular rate of 98 normal axis borderline prolonged QTc at 513 no acute ischemic changes [EF]   1323 Upon reassessment patient states that he would like to leave the hospital AGAINST MEDICAL ADVICE as he thinks crossings is going to be here in 2 minutes to pick him up to take him for drug rehabilitation.  I discussed the risks of leaving the hospital AGAINST MEDICAL ADVICE including possible missed heart attack, permanent disability and potentially even death.  The patient is able to verbalize these risks and still wishes to leave the hospital AGAINST MEDICAL ADVICE.  He states he will follow-up with his cardiologist and feels he needs rehabilitation at this time. [JJ]      ED Course User Index  [EF] Nithya Viramontes DO  [JJ] Kellie Quevedo PA-C         Diagnoses as of 11/11/24 1813   Chest pain, unspecified type                 No data recorded                                 Medical Decision Making  Parts of this chart have been completed using voice  recognition software. Please excuse any errors of transcription.  My thought process and reason for plan has been formulated from the time that I saw the patient until the time of disposition and is not specific to one specific moment during their visit and furthermore my MDM encompasses this entire chart and not only this text box.      HPI: Detailed above.    Exam: A medically appropriate exam performed, outlined above, given the known history and presentation.    History obtained from: Patient    EKG: Interpreted by attending physician and reviewed by me    Social Determinants of Health considered during this visit: Lives independently, seen frequently in the ER    Medications given during visit:  Medications - No data to display     Diagnostic/tests  Labs Reviewed   CBC WITH AUTO DIFFERENTIAL - Abnormal       Result Value    WBC 10.3      nRBC 0.0      RBC 3.65 (*)     Hemoglobin 11.9 (*)     Hematocrit 36.6 (*)           MCH 32.6      MCHC 32.5      RDW 13.0      Platelets 223      Neutrophils % 80.3      Immature Granulocytes %, Automated 0.4      Lymphocytes % 11.7      Monocytes % 6.9      Eosinophils % 0.0      Basophils % 0.7      Neutrophils Absolute 8.29 (*)     Immature Granulocytes Absolute, Automated 0.04      Lymphocytes Absolute 1.21      Monocytes Absolute 0.71      Eosinophils Absolute 0.00      Basophils Absolute 0.07     COMPREHENSIVE METABOLIC PANEL - Abnormal    Glucose 139 (*)     Sodium 139      Potassium 3.2 (*)     Chloride 106      Bicarbonate 20 (*)     Anion Gap 16      Urea Nitrogen 38 (*)     Creatinine 2.65 (*)     eGFR 28 (*)     Calcium 9.5      Albumin 4.6      Alkaline Phosphatase 82      Total Protein 8.0      AST 19      Bilirubin, Total 0.6      ALT 14     SERIAL TROPONIN-INITIAL - Abnormal    Troponin I, High Sensitivity 26 (*)     Narrative:     Less than 99th percentile of normal range cutoff-  Female and children under 18 years old <14 ng/L; Male <21 ng/L:  Negative  Repeat testing should be performed if clinically indicated.     Female and children under 18 years old 14-50 ng/L; Male 21-50 ng/L:  Consistent with possible cardiac damage and possible increased clinical   risk. Serial measurements may help to assess extent of myocardial damage.     >50 ng/L: Consistent with cardiac damage, increased clinical risk and  myocardial infarction. Serial measurements may help assess extent of   myocardial damage.      NOTE: Children less than 1 year old may have higher baseline troponin   levels and results should be interpreted in conjunction with the overall   clinical context.     NOTE: Troponin I testing is performed using a different   testing methodology at [facility] than at other   [facility]. Direct result comparisons should only   be made within the same method.   TROPONIN SERIES- (INITIAL, 1 HR)    Narrative:     The following orders were created for panel order Troponin I Series, High Sensitivity (0, 1 HR).  Procedure                               Abnormality         Status                     ---------                               -----------         ------                     Troponin I, High Sensiti...[[id number]]  Abnormal            Final result               Troponin, High Sensitivi...[[id number]]                                                   Please view results for these tests on the individual orders.   SERIAL TROPONIN, 1 HOUR      XR chest 1 view   Final Result   No acute cardiopulmonary process is evident.        MACRO:   None        Signed by: Don Franco 11/10/2024 1:08 PM   Dictation workstation:   [id number]           Considerations/further MDM:  Patient is a 48-year-old male presenting for evaluation of chest pain    I saw this patient in conjunction with Dr. Viramontes.    Patient is disabled but otherwise awake and alert in no apparent distress during the visit.  Vital signs are with hypertension and documented tachypnea but  the patient does not appear to be in any respiratory distress on my exam.  Pulses are equal bilaterally.  Heart and lung sounds are benign.  During the ER visit the patient is requesting narcotic pain medication.  EKG is without evidence of acute ischemia and laboratory workup is with mild elevation in creatinine but otherwise unremarkable.  Initial cardiac enzyme is 26.  Prior to repeat cardiac enzyme being drawn for further evaluation the patient did request to leave the hospital AGAINST MEDICAL ADVICE stating he would like to seek rehabilitative therapy for his drug use.  I did discuss the risks of leaving including possible missed MI or other significant etiology that could result in death or permanent disability.  The patient is able to verbalize the risks back to me and states that he would still like to leave the hospital AGAINST MEDICAL ADVICE.  He did leave in good condition.  He was advised that he could return to the emergency department at any time for further evaluation and management of hip symptoms.  We discussed the nature and purpose, risks and benefits, as well as, the alternatives of the given diagnosis and concerns. Time was given to allow the opportunity to ask questions and consider their options, and after the discussion, the patient decided to refuse the offered treatment plan. The patient was informed that refusal could lead to, but was not limited to, death, permanent disability, or severe pain, loss of current lifestyles and abilities. If present, I asked the relatives or significant others to dissuade them without success. Prior to refusing, their nurse and I determined and agreed that the patient had the capacity to make their decision and understood the consequences of that decision. They signed the refusal of treatment form and their nurse signed the form agreeing that the patient/guardian had received informed consent. After refusal, I made every reasonable opportunity to treat them  to the best of my ability while still trying to accommodate the patient’s wishes and desires.        Procedure  Procedures     Kellie Quevedo PA-C  11/11/24 1815

## 2024-11-10 NOTE — ED TRIAGE NOTES
Per EMS:  Pt at [address], doing cocain 2 hrs prior. Cp started 30 min ago. Pt took 3 nitroglycerin of his own. Squad gave 324 ASA. PT has pain in mid chest. Hx of MI. 107 Sinus Tach.  167/89 [id number]% RA. Pt placed on 4lpm O2 via NC. 96% Blood sugar 170.  UTO IV access.

## 2024-11-10 NOTE — DISCHARGE INSTRUCTIONS
You are leaving the hospital AGAINST MEDICAL ADVICE.  Risks were discussed with you at bedside.  You are always welcome to return to the ER with any new onset or worsening of symptoms for reevaluation.  Please refer to the chest pain instructions attached for further care information.    It is important to remember that your care does not end here and you must continue to monitor your condition closely. Please return to the emergency department for any worsening or concerning signs or symptoms as directed by our conversations and the discharge instructions. If you do not have a doctor please contact the referral number on your discharge instructions. Please contact any physician specialists provided in your discharge notes as it is very important to follow up with them regarding your condition. If you are unable to reach the physicians provided, please come back to the Emergency Department at any time.

## 2024-11-12 LAB
ATRIAL RATE: 98 BPM
P AXIS: 84 DEGREES
P OFFSET: 202 MS
P ONSET: 136 MS
PR INTERVAL: 168 MS
Q ONSET: 220 MS
QRS COUNT: 16 BEATS
QRS DURATION: 90 MS
QT INTERVAL: 402 MS
QTC CALCULATION(BAZETT): 513 MS
QTC FREDERICIA: 473 MS
R AXIS: 63 DEGREES
T AXIS: 73 DEGREES
T OFFSET: 421 MS
VENTRICULAR RATE: 98 BPM

## 2024-12-20 ENCOUNTER — APPOINTMENT (OUTPATIENT)
Dept: CARDIOLOGY | Facility: HOSPITAL | Age: 49
End: 2024-12-20
Payer: MEDICAID

## 2024-12-20 ENCOUNTER — APPOINTMENT (OUTPATIENT)
Dept: RADIOLOGY | Facility: HOSPITAL | Age: 49
End: 2024-12-20
Payer: MEDICAID

## 2024-12-20 ENCOUNTER — HOSPITAL ENCOUNTER (EMERGENCY)
Facility: HOSPITAL | Age: 49
Discharge: HOME | End: 2024-12-20
Attending: EMERGENCY MEDICINE
Payer: MEDICAID

## 2024-12-20 VITALS
SYSTOLIC BLOOD PRESSURE: 170 MMHG | BODY MASS INDEX: 22.9 KG/M2 | WEIGHT: 160 LBS | HEIGHT: 70 IN | RESPIRATION RATE: 17 BRPM | TEMPERATURE: 98.6 F | DIASTOLIC BLOOD PRESSURE: 80 MMHG | OXYGEN SATURATION: 98 % | HEART RATE: 89 BPM

## 2024-12-20 DIAGNOSIS — M25.562 ACUTE PAIN OF LEFT KNEE: Primary | ICD-10-CM

## 2024-12-20 LAB
ALBUMIN SERPL BCP-MCNC: 4 G/DL (ref 3.4–5)
ALP SERPL-CCNC: 102 U/L (ref 33–120)
ALT SERPL W P-5'-P-CCNC: 10 U/L (ref 10–52)
ANION GAP SERPL CALC-SCNC: 11 MMOL/L (ref 10–20)
AST SERPL W P-5'-P-CCNC: 13 U/L (ref 9–39)
BASOPHILS # BLD AUTO: 0.11 X10*3/UL (ref 0–0.1)
BASOPHILS NFR BLD AUTO: 1.4 %
BILIRUB SERPL-MCNC: 0.4 MG/DL (ref 0–1.2)
BUN SERPL-MCNC: 31 MG/DL (ref 6–23)
CALCIUM SERPL-MCNC: 8.8 MG/DL (ref 8.6–10.3)
CARDIAC TROPONIN I PNL SERPL HS: 12 NG/L (ref 0–20)
CHLORIDE SERPL-SCNC: 109 MMOL/L (ref 98–107)
CO2 SERPL-SCNC: 22 MMOL/L (ref 21–32)
CREAT SERPL-MCNC: 1.93 MG/DL (ref 0.5–1.3)
EGFRCR SERPLBLD CKD-EPI 2021: 41 ML/MIN/1.73M*2
EOSINOPHIL # BLD AUTO: 0.09 X10*3/UL (ref 0–0.7)
EOSINOPHIL NFR BLD AUTO: 1.1 %
ERYTHROCYTE [DISTWIDTH] IN BLOOD BY AUTOMATED COUNT: 12.8 % (ref 11.5–14.5)
GLUCOSE SERPL-MCNC: 107 MG/DL (ref 74–99)
HCT VFR BLD AUTO: 37.7 % (ref 41–52)
HGB BLD-MCNC: 12.2 G/DL (ref 13.5–17.5)
HOLD SPECIMEN: NORMAL
IMM GRANULOCYTES # BLD AUTO: 0.02 X10*3/UL (ref 0–0.7)
IMM GRANULOCYTES NFR BLD AUTO: 0.2 % (ref 0–0.9)
LYMPHOCYTES # BLD AUTO: 1.71 X10*3/UL (ref 1.2–4.8)
LYMPHOCYTES NFR BLD AUTO: 21.3 %
MCH RBC QN AUTO: 32.4 PG (ref 26–34)
MCHC RBC AUTO-ENTMCNC: 32.4 G/DL (ref 32–36)
MCV RBC AUTO: 100 FL (ref 80–100)
MONOCYTES # BLD AUTO: 0.6 X10*3/UL (ref 0.1–1)
MONOCYTES NFR BLD AUTO: 7.5 %
NEUTROPHILS # BLD AUTO: 5.48 X10*3/UL (ref 1.2–7.7)
NEUTROPHILS NFR BLD AUTO: 68.5 %
NRBC BLD-RTO: 0 /100 WBCS (ref 0–0)
PLATELET # BLD AUTO: 244 X10*3/UL (ref 150–450)
POTASSIUM SERPL-SCNC: 4.1 MMOL/L (ref 3.5–5.3)
PROT SERPL-MCNC: 7.1 G/DL (ref 6.4–8.2)
RBC # BLD AUTO: 3.77 X10*6/UL (ref 4.5–5.9)
SODIUM SERPL-SCNC: 138 MMOL/L (ref 136–145)
WBC # BLD AUTO: 8 X10*3/UL (ref 4.4–11.3)

## 2024-12-20 PROCEDURE — 80053 COMPREHEN METABOLIC PANEL: CPT

## 2024-12-20 PROCEDURE — 73560 X-RAY EXAM OF KNEE 1 OR 2: CPT | Mod: LT

## 2024-12-20 PROCEDURE — 96374 THER/PROPH/DIAG INJ IV PUSH: CPT

## 2024-12-20 PROCEDURE — 2500000001 HC RX 250 WO HCPCS SELF ADMINISTERED DRUGS (ALT 637 FOR MEDICARE OP)

## 2024-12-20 PROCEDURE — 2500000004 HC RX 250 GENERAL PHARMACY W/ HCPCS (ALT 636 FOR OP/ED)

## 2024-12-20 PROCEDURE — 71045 X-RAY EXAM CHEST 1 VIEW: CPT | Performed by: RADIOLOGY

## 2024-12-20 PROCEDURE — 99285 EMERGENCY DEPT VISIT HI MDM: CPT | Performed by: EMERGENCY MEDICINE

## 2024-12-20 PROCEDURE — 36415 COLL VENOUS BLD VENIPUNCTURE: CPT

## 2024-12-20 PROCEDURE — 93010 ELECTROCARDIOGRAM REPORT: CPT | Performed by: EMERGENCY MEDICINE

## 2024-12-20 PROCEDURE — 71045 X-RAY EXAM CHEST 1 VIEW: CPT

## 2024-12-20 PROCEDURE — 84484 ASSAY OF TROPONIN QUANT: CPT

## 2024-12-20 PROCEDURE — 73560 X-RAY EXAM OF KNEE 1 OR 2: CPT | Mod: LEFT SIDE | Performed by: RADIOLOGY

## 2024-12-20 PROCEDURE — 93005 ELECTROCARDIOGRAM TRACING: CPT

## 2024-12-20 PROCEDURE — 85025 COMPLETE CBC W/AUTO DIFF WBC: CPT

## 2024-12-20 RX ORDER — KETOROLAC TROMETHAMINE 15 MG/ML
15 INJECTION, SOLUTION INTRAMUSCULAR; INTRAVENOUS ONCE
Status: COMPLETED | OUTPATIENT
Start: 2024-12-20 | End: 2024-12-20

## 2024-12-20 RX ORDER — HYDROCODONE BITARTRATE AND ACETAMINOPHEN 5; 325 MG/1; MG/1
1 TABLET ORAL ONCE
Status: COMPLETED | OUTPATIENT
Start: 2024-12-20 | End: 2024-12-20

## 2024-12-20 RX ORDER — OXYCODONE AND ACETAMINOPHEN 5; 325 MG/1; MG/1
1 TABLET ORAL EVERY 8 HOURS PRN
Qty: 5 TABLET | Refills: 0 | Status: SHIPPED | OUTPATIENT
Start: 2024-12-20 | End: 2024-12-24

## 2024-12-20 RX ORDER — HYDROCODONE BITARTRATE AND ACETAMINOPHEN 5; 325 MG/1; MG/1
1 TABLET ORAL ONCE
Status: DISCONTINUED | OUTPATIENT
Start: 2024-12-20 | End: 2024-12-20

## 2024-12-20 ASSESSMENT — PAIN DESCRIPTION - DESCRIPTORS
DESCRIPTORS: PRESSURE
DESCRIPTORS: ACHING;THROBBING
DESCRIPTORS: ACHING;THROBBING

## 2024-12-20 ASSESSMENT — LIFESTYLE VARIABLES
EVER HAD A DRINK FIRST THING IN THE MORNING TO STEADY YOUR NERVES TO GET RID OF A HANGOVER: NO
HAVE PEOPLE ANNOYED YOU BY CRITICIZING YOUR DRINKING: NO
HAVE YOU EVER FELT YOU SHOULD CUT DOWN ON YOUR DRINKING: NO
TOTAL SCORE: 0
EVER FELT BAD OR GUILTY ABOUT YOUR DRINKING: NO

## 2024-12-20 ASSESSMENT — COLUMBIA-SUICIDE SEVERITY RATING SCALE - C-SSRS
6. HAVE YOU EVER DONE ANYTHING, STARTED TO DO ANYTHING, OR PREPARED TO DO ANYTHING TO END YOUR LIFE?: NO
1. IN THE PAST MONTH, HAVE YOU WISHED YOU WERE DEAD OR WISHED YOU COULD GO TO SLEEP AND NOT WAKE UP?: NO
2. HAVE YOU ACTUALLY HAD ANY THOUGHTS OF KILLING YOURSELF?: NO

## 2024-12-20 ASSESSMENT — PAIN - FUNCTIONAL ASSESSMENT
PAIN_FUNCTIONAL_ASSESSMENT: 0-10
PAIN_FUNCTIONAL_ASSESSMENT: 0-10

## 2024-12-20 ASSESSMENT — PAIN SCALES - GENERAL
PAINLEVEL_OUTOF10: 10 - WORST POSSIBLE PAIN
PAINLEVEL_OUTOF10: 10 - WORST POSSIBLE PAIN
PAINLEVEL_OUTOF10: 7

## 2024-12-20 ASSESSMENT — PAIN DESCRIPTION - PAIN TYPE: TYPE: CHRONIC PAIN

## 2024-12-20 ASSESSMENT — PAIN DESCRIPTION - LOCATION
LOCATION: KNEE
LOCATION: KNEE

## 2024-12-20 ASSESSMENT — PAIN DESCRIPTION - PROGRESSION: CLINICAL_PROGRESSION: NOT CHANGED

## 2024-12-20 ASSESSMENT — PAIN DESCRIPTION - ORIENTATION
ORIENTATION: LEFT
ORIENTATION: LEFT

## 2024-12-20 ASSESSMENT — PAIN DESCRIPTION - FREQUENCY: FREQUENCY: CONSTANT/CONTINUOUS

## 2024-12-20 ASSESSMENT — PAIN DESCRIPTION - DIRECTION: RADIATING_TOWARDS: X1 WEEK

## 2024-12-20 NOTE — ED PROVIDER NOTES
Emergency Department Provider Note        History of Present Illness     History provided by: Patient  Limitations to History: None  External Records Reviewed with Brief Summary: None    HPI:  Alex Hall is a 49 y.o. male arriving to the [facility] emergency department being transported by [facility] with a chief complaint of left knee pain.  The patient says that this pain has been occurring for quite some time but he fell 2 days ago and has been getting even worse ever since.  The patient has his left lower extremity with a below the knee amputation.  The patient now cannot even bend his left knee without extreme pain.  The patient says that he has a 9 out of 10 pain that is constant.    Physical Exam   Triage vitals:  T 37 °C (98.6 °F)  HR 88  BP (!) 155/97  RR 18  O2 100 % None (Room air)    Physical Exam  Vitals and nursing note reviewed.   Constitutional:       General: He is not in acute distress.     Appearance: Normal appearance. He is not ill-appearing, toxic-appearing or diaphoretic.   HENT:      Head: Normocephalic and atraumatic.      Right Ear: External ear normal.      Left Ear: External ear normal.      Nose: Nose normal. No congestion or rhinorrhea.      Mouth/Throat:      Mouth: Mucous membranes are moist.      Pharynx: Oropharynx is clear. No oropharyngeal exudate.   Eyes:      General: No scleral icterus.        Right eye: No discharge.         Left eye: No discharge.      Extraocular Movements: Extraocular movements intact.      Conjunctiva/sclera: Conjunctivae normal.      Pupils: Pupils are equal, round, and reactive to light.   Cardiovascular:      Rate and Rhythm: Normal rate and regular rhythm.      Pulses: Normal pulses.      Heart sounds: Normal heart sounds.   Pulmonary:      Effort: Pulmonary effort is normal.      Breath sounds: Rhonchi and rales present.   Abdominal:      General: There is no distension.      Palpations: Abdomen is soft.      Tenderness: There is no abdominal  tenderness. There is no right CVA tenderness, left CVA tenderness, guarding or rebound.   Musculoskeletal:      Cervical back: Normal range of motion and neck supple.      Comments: The patient has a left below the knee amputation and has pain in the posterior aspect of his left knee, he is unable to bend his left knee without causing  pain to himself.     Skin:     General: Skin is warm and dry.      Capillary Refill: Capillary refill takes less than 2 seconds.   Neurological:      General: No focal deficit present.      Mental Status: He is alert and oriented to person, place, and time.      Comments: Patient was able to accurately describe the day, month, and year when asked what the day, month, and year were. The patient is able to explain their symptoms and the events leading to their presentation to the emergency department.     Psychiatric:         Mood and Affect: Mood normal.         Behavior: Behavior normal.          Medical Decision Making & ED Course   Medical Decision Makin y.o. male presenting with extreme pain behind his left knee.  The patient does have a below the knee amputation on the left side as well, the stump is in good condition with no erythema or ecchymosis or open lacerations or purulence or any other discharge.  Behind the knee there is no erythema, ecchymosis, or any other sign of infection.  The patient has been seen at multiple other hospitals over the past 10 days for this left knee pain and a DVT study has been performed to rule out DVT.  The patient says that initially he received his below the knee amputation due to DVT and osteomyelitis that has affected his left leg.    Basic cardiac labs were ordered to assess the patient and all of his values were at baseline or were improving from a previous visit.    On my interpretation of labs, results are unimpressive for systemic inflammation or infection, acute anemia or blood loss, WILLIAM, hepatitis, or electrolyte abnormalities.      The patient was able to set up an appointment for new prosthetic while he was in the emergency department and this will benefit him greatly due to his current prosthetic causing him to fall over.    The patient had been treated with some pain medication and was given a follow-up appointment for further help in management of his pain and the left posterior knee.  The patient has used many opioids over the years for pain management and has gone tolerant of opiates, they do not provide as much pain relief as they used to.  The patient is comfortable with being discharged and receiving further help for his pain and pain management.  ----      Differential diagnoses considered include but are not limited to: Osteomyelitis, cellulitis     Social Determinants of Health which Significantly Impact Care: None identified     EKG Independent Interpretation: EKG interpreted by myself. Please see ED Course for full interpretation.    Independent Result Review and Interpretation: Relevant laboratory and radiographic results were reviewed and independently interpreted by myself.  As necessary, they are commented on in the ED Course.    Chronic conditions affecting the patient's care: As documented above in [facility]    The patient was discussed with the following consultants/services: None    Care Considerations: As documented above in [facility]    ED Course:  ED Course as of 12/20/24 2120   Fri Dec 20, 2024   1114 Patient was requesting pain medication when he initially arrived and was given a Norco for pain medication.  After approximately half hour the patient said that the Norco was not enough to control the pain.  The patient was then given Toradol because he previously tolerated Toradol and he was okay with using it.  After the Toradol was used the patient was asking to receive morphine because morphine is the only pain medication that can help his pain due to him being allergic to NSAIDs. [ROQUE]   1117 EGFR(!): 41  Patient's kidney  function is improving over the last time he was here, BUN of 31 and creatinine of 1.93 is a large improvement. [ROQUE]   1120 X-ray of the left knee shows no acute findings or pathologies. [ROQUE]   1121 X-ray of the chest shows no active cardiopulmonary disease or any acute pathologies. [ROQUE]   1121 The patient has been able to set up an appointment for his prosthetic joint in January [ROQUE]   1941 EKG taken at 10:18 AM on December 20, 2024 shows a normal sinus rhythm with approximate 94 bpm and normal axis.  Normal RI interval narrow QRS duration and prolonged QT at 465 ms.  No STEMI is noted. [ROQUE]      ED Course User Index  [ROQUE] Bipin Scott DO         Diagnoses as of 12/20/24 2120   Acute pain of left knee     Disposition   As a result of the work-up, the patient was discharged home.  he was informed of his diagnosis and instructed to come back with any concerns or worsening of condition.  he and was agreeable to the plan as discussed above.  he was given the opportunity to ask questions.  All of the patient's questions were answered.    Procedures   Procedures    Patient seen and discussed with ED attending physician.    Bipin Scott DO  Emergency Medicine    =================Attending note===============    The patient was seen by the resident/fellow.  I have personally performed a substantive portion of the encounter.  I have seen and examined the patient; agree with the workup, evaluation, MDM,   management and diagnosis.  The care plan has been discussed with the resident; I have reviewed the resident’s note and agree with the documented findings.      This is a 49 y.o. male who presents to ER with left knee pain.  Patient has a below the knee amputation.  He had a fall on Wednesday.  He states that his prosthetic twisted and caused him to stumble and he hit the ground with his left knee.  No head injury.  No loss of consciousness.  No nausea or vomiting.  Having intermittent chest pain for over a month  now.  He does have a history of chronic kidney disease, coronary disease, DVT, osteitis.  His amputation back in 2017.  There was multiple revision surgery was 2017.  No fevers or chills.  No other systemic symptoms.    When reviewing prior records, patient was just seen at [facility] 2 days ago for flu-like illness.  He had negative venous duplex and x-rays at that time.  It was also noted that he has had 14 other ER visits in the last 3 months.      Head atraumatic.  Heart regular.  Lungs clear.  Abdomen soft and nontender.  There is no warmth erythema of his amputation stump.  There is no warmth or erythema of the knee joint.  No swelling of the legs.  There is no laxity in the knee.  No joint effusion.  No signs of infection.  No crepitance.  No wounds on the skin of the knee.    Leukocytosis.  Hemoglobin 12.5.  Troponin negative.  Renal insufficiency with GFR 41.  Chest x-ray is no acute findings.  Knee x-ray is no acute findings.    EKG: Normal sinus rhythm with a ventricular rate of 94.  Normal intervals.  QTc 465.  No ST changes.    Patient is discharged home.  Follow-up with primary care doctor.  To follow-up with orthopedics.  He is to return to nearest ER for any new or worsening symptoms.  Patient did call make an appointment with his prosthetic provider.  He is given a short course of percocet.  He is no longer on Buprenorphine.              ==========================================       Bipin Scott,   Resident  12/20/24 2120

## 2024-12-20 NOTE — DISCHARGE INSTRUCTIONS
Seek immediate medical attention if you develop: increasing pain, numbness, tingling, weakness, loss of motion in your knee or leg, your leg becomes pale or cold, or you develop any new or worsening symptoms.    Seek immediate medical attention if you develop: worsening chest pain, new chest pain, nausea, vomiting, weakness, numbness, tingling, excessive sweating, shortness of breath, difficulty breathing, loss of motion in your arms or legs, or any new or worsening symptoms.

## 2024-12-21 LAB
ATRIAL RATE: 94 BPM
P AXIS: 57 DEGREES
P OFFSET: 194 MS
P ONSET: 142 MS
PR INTERVAL: 152 MS
Q ONSET: 218 MS
QRS COUNT: 16 BEATS
QRS DURATION: 80 MS
QT INTERVAL: 372 MS
QTC CALCULATION(BAZETT): 465 MS
QTC FREDERICIA: 432 MS
R AXIS: 63 DEGREES
T AXIS: 79 DEGREES
T OFFSET: 404 MS
VENTRICULAR RATE: 94 BPM

## 2024-12-24 ENCOUNTER — APPOINTMENT (OUTPATIENT)
Dept: RADIOLOGY | Facility: HOSPITAL | Age: 49
End: 2024-12-24
Payer: MEDICAID

## 2024-12-24 ENCOUNTER — HOSPITAL ENCOUNTER (EMERGENCY)
Facility: HOSPITAL | Age: 49
Discharge: STILL ADMITTED OR ADMINISTRATIVE | End: 2024-12-24
Payer: MEDICAID

## 2024-12-24 ENCOUNTER — HOSPITAL ENCOUNTER (EMERGENCY)
Facility: HOSPITAL | Age: 49
Discharge: HOME | End: 2024-12-24
Attending: EMERGENCY MEDICINE
Payer: MEDICAID

## 2024-12-24 VITALS
HEART RATE: 72 BPM | RESPIRATION RATE: 18 BRPM | SYSTOLIC BLOOD PRESSURE: 148 MMHG | BODY MASS INDEX: 21.47 KG/M2 | OXYGEN SATURATION: 99 % | TEMPERATURE: 97.9 F | DIASTOLIC BLOOD PRESSURE: 89 MMHG | WEIGHT: 150 LBS | HEIGHT: 70 IN

## 2024-12-24 DIAGNOSIS — R52 PAIN: Primary | ICD-10-CM

## 2024-12-24 DIAGNOSIS — M25.562 ACUTE PAIN OF LEFT KNEE: Primary | ICD-10-CM

## 2024-12-24 PROCEDURE — 73030 X-RAY EXAM OF SHOULDER: CPT | Mod: LEFT SIDE | Performed by: RADIOLOGY

## 2024-12-24 PROCEDURE — 99284 EMERGENCY DEPT VISIT MOD MDM: CPT | Performed by: EMERGENCY MEDICINE

## 2024-12-24 PROCEDURE — 73564 X-RAY EXAM KNEE 4 OR MORE: CPT | Mod: LEFT SIDE | Performed by: RADIOLOGY

## 2024-12-24 PROCEDURE — 4500999001 HC ED NO CHARGE

## 2024-12-24 PROCEDURE — 73502 X-RAY EXAM HIP UNI 2-3 VIEWS: CPT | Mod: LT

## 2024-12-24 PROCEDURE — 73030 X-RAY EXAM OF SHOULDER: CPT | Mod: LT

## 2024-12-24 PROCEDURE — 99283 EMERGENCY DEPT VISIT LOW MDM: CPT

## 2024-12-24 PROCEDURE — 73564 X-RAY EXAM KNEE 4 OR MORE: CPT | Mod: LT

## 2024-12-24 PROCEDURE — 2500000001 HC RX 250 WO HCPCS SELF ADMINISTERED DRUGS (ALT 637 FOR MEDICARE OP)

## 2024-12-24 PROCEDURE — 73502 X-RAY EXAM HIP UNI 2-3 VIEWS: CPT | Mod: LEFT SIDE | Performed by: RADIOLOGY

## 2024-12-24 RX ORDER — OXYCODONE AND ACETAMINOPHEN 5; 325 MG/1; MG/1
1 TABLET ORAL ONCE
Status: DISCONTINUED | OUTPATIENT
Start: 2024-12-24 | End: 2024-12-24 | Stop reason: HOSPADM

## 2024-12-24 RX ORDER — OXYCODONE AND ACETAMINOPHEN 5; 325 MG/1; MG/1
1 TABLET ORAL EVERY 6 HOURS PRN
Qty: 5 TABLET | Refills: 0 | Status: SHIPPED | OUTPATIENT
Start: 2024-12-24 | End: 2024-12-24

## 2024-12-24 RX ORDER — OXYCODONE AND ACETAMINOPHEN 5; 325 MG/1; MG/1
1 TABLET ORAL ONCE
Status: COMPLETED | OUTPATIENT
Start: 2024-12-24 | End: 2024-12-24

## 2024-12-24 RX ORDER — OXYCODONE AND ACETAMINOPHEN 5; 325 MG/1; MG/1
1 TABLET ORAL EVERY 6 HOURS PRN
Qty: 5 TABLET | Refills: 0 | Status: SHIPPED | OUTPATIENT
Start: 2024-12-24 | End: 2024-12-27

## 2024-12-24 RX ADMIN — OXYCODONE HYDROCHLORIDE AND ACETAMINOPHEN 1 TABLET: 5; 325 TABLET ORAL at 12:50

## 2024-12-24 ASSESSMENT — PAIN DESCRIPTION - PAIN TYPE
TYPE: ACUTE PAIN
TYPE: CHRONIC PAIN

## 2024-12-24 ASSESSMENT — LIFESTYLE VARIABLES
TOTAL SCORE: 0
EVER FELT BAD OR GUILTY ABOUT YOUR DRINKING: NO
EVER HAD A DRINK FIRST THING IN THE MORNING TO STEADY YOUR NERVES TO GET RID OF A HANGOVER: NO
HAVE YOU EVER FELT YOU SHOULD CUT DOWN ON YOUR DRINKING: NO
HAVE PEOPLE ANNOYED YOU BY CRITICIZING YOUR DRINKING: NO

## 2024-12-24 ASSESSMENT — PAIN SCALES - GENERAL
PAINLEVEL_OUTOF10: 8
PAINLEVEL_OUTOF10: 8
PAINLEVEL_OUTOF10: 0 - NO PAIN
PAINLEVEL_OUTOF10: 3

## 2024-12-24 ASSESSMENT — PAIN DESCRIPTION - DESCRIPTORS
DESCRIPTORS: ACHING
DESCRIPTORS: STABBING

## 2024-12-24 ASSESSMENT — PAIN DESCRIPTION - FREQUENCY
FREQUENCY: CONSTANT/CONTINUOUS
FREQUENCY: CONSTANT/CONTINUOUS

## 2024-12-24 ASSESSMENT — PAIN DESCRIPTION - ORIENTATION
ORIENTATION: LEFT
ORIENTATION: LEFT

## 2024-12-24 ASSESSMENT — PAIN DESCRIPTION - ONSET: ONSET: ONGOING

## 2024-12-24 ASSESSMENT — PAIN - FUNCTIONAL ASSESSMENT
PAIN_FUNCTIONAL_ASSESSMENT: 0-10
PAIN_FUNCTIONAL_ASSESSMENT: 0-10

## 2024-12-24 ASSESSMENT — PAIN DESCRIPTION - LOCATION
LOCATION: KNEE
LOCATION: KNEE

## 2024-12-24 ASSESSMENT — PAIN DESCRIPTION - PROGRESSION: CLINICAL_PROGRESSION: GRADUALLY IMPROVING

## 2024-12-24 NOTE — ED NOTES
Patient ambulated to and from restroom with cane without incident.      Daisy Mann RN  12/24/24 5411

## 2024-12-24 NOTE — ED PROVIDER NOTES
EMERGENCY DEPARTMENT ENCOUNTER      Pt Name: Alex Hall  MRN: [id number]  Birthdate 1975  Date of evaluation: 12/24/2024  Provider: Eric Pearson MD    CHIEF COMPLAINT       Chief Complaint   Patient presents with    Knee Pain     Per pt pt fell 2 days ago on left knee.. pt now having pain in knee and groin      HISTORY OF PRESENT ILLNESS    HPI  49-year-old male presents emergency department chief complaint of knee pain.  Patient claims that he went to go stand up yesterday and had a fall.  The patient is a left below the knee amputee due to osteomyelitis of that leg.  Patient claims that he lost his balance and fell he did indicates he did not hit his head or lose consciousness.  He is endorsing severe pain into the knee and hip.  He claims that he has had a previous hip replacement on that left side.  Nursing Notes were reviewed.    PAST MEDICAL HISTORY     Past Medical History:   Diagnosis Date    Hypertension     NSTEMI (non-ST elevated myocardial infarction) (Multi)     x2 in 2024    Pericarditis ([facility])     Stroke (cerebrum) (Multi) 2017         SURGICAL HISTORY       Past Surgical History:   Procedure Laterality Date    HIP SURGERY Left     LEG AMPUTATION Left     bka         CURRENT MEDICATIONS       Previous Medications    ACETAMINOPHEN (TYLENOL) 500 MG TABLET    Take 2 tablets (1,000 mg) by mouth every 8 hours if needed for mild pain (1 - 3).    AMLODIPINE (NORVASC) 2.5 MG TABLET    Take 1 tablet (2.5 mg) by mouth once daily.    APIXABAN (ELIQUIS) 5 MG TABLET    Take 1 tablet (5 mg) by mouth 2 times a day.    ARIPIPRAZOLE (ABILIFY) 5 MG TABLET    Take 1 tablet (5 mg) by mouth once daily.    ATORVASTATIN (LIPITOR) 80 MG TABLET    Take 1 tablet (80 mg) by mouth once daily at bedtime.    BUPROPION XL (WELLBUTRIN XL) 150 MG 24 HR TABLET    Take 2 tablets (300 mg) by mouth once daily. Do not crush, chew, or split.    CARVEDILOL (COREG) 12.5 MG TABLET    Take 1 tablet (12.5 mg) by mouth 2 times daily  "(morning and late afternoon).    CLOPIDOGREL (PLAVIX) 75 MG TABLET    Take 1 tablet (75 mg) by mouth once daily.    EMPAGLIFLOZIN (JARDIANCE) 10 MG    Take 1 tablet (10 mg) by mouth once daily.    EZETIMIBE (ZETIA) 10 MG TABLET    Take 1 tablet (10 mg) by mouth once daily.    HYDROXYZINE HCL (ATARAX) 25 MG TABLET    Take 1 tablet (25 mg) by mouth every 6 hours if needed.    ISOSORBIDE MONONITRATE ER (IMDUR) 30 MG 24 HR TABLET    Take 3 tablets (90 mg) by mouth once daily.    LIDOCAINE 4 % PATCH    Place 1 patch over 12 hours on the skin once every 24 hours. Remove & discard patch within 12 hours or as directed by MD.    LISINOPRIL 10 MG TABLET    Take 1 tablet (10 mg) by mouth once daily.    NITROGLYCERIN (NITROSTAT) 0.4 MG SL TABLET    Place 1 tablet (0.4 mg) under the tongue every 5 minutes if needed for chest pain. May repeat every 5 minutes for up to 3 doses.    OXYCODONE-ACETAMINOPHEN (PERCOCET) 5-325 MG TABLET    Take 1 tablet by mouth every 8 hours if needed for severe pain (7 - 10).    PANTOPRAZOLE (PROTONIX) 40 MG EC TABLET    Take 1 tablet (40 mg) by mouth once daily.    TOPIRAMATE (TOPAMAX) 50 MG TABLET    Take 1 tablet (50 mg) by mouth twice a day.       ALLERGIES     Duloxetine, Voltaren [diclofenac sodium], Nsaids (non-steroidal anti-inflammatory drug), and Varenicline    FAMILY HISTORY     No family history on file.       SOCIAL HISTORY       Social History     Socioeconomic History    Marital status: Single   Tobacco Use    Smoking status: Every Day     Current packs/day: 0.50     Average packs/day: 0.5 packs/day for 78.0 years (39.0 ttl pk-yrs)     Types: Cigarettes     Start date: 1986    Smokeless tobacco: Never   Substance and Sexual Activity    Alcohol use: Not Currently    Drug use: Not Currently     Types: \"Crack\" cocaine     Social Drivers of Health     Financial Resource Strain: Low Risk  (9/7/2024)    Overall Financial Resource Strain (CARDIA)     Difficulty of Paying Living Expenses: Not " hard at all   Food Insecurity: No Food Insecurity (11/7/2024)    Received from [facility]    Hunger Vital Sign     Worried About Running Out of Food in the Last Year: Never true     Ran Out of Food in the Last Year: Never true   Transportation Needs: No Transportation Needs (9/11/2024)    Received from [facility]    PRAPARE - Transportation     Lack of Transportation (Medical): No     Lack of Transportation (Non-Medical): No   Housing Stability: High Risk (9/11/2024)    Received from [facility]    Housing Stability Vital Sign     Unable to Pay for Housing in the Last Year: Yes     Homeless in the Last Year: Yes       SCREENINGS                        PHYSICAL EXAM    (up to 7 for level 4, 8 or more for level 5)     ED Triage Vitals [12/24/24 1042]   Temperature Heart Rate Respirations BP   36.6 °C (97.9 °F) 71 20 (!) 172/92      Pulse Ox Temp Source Heart Rate Source Patient Position   98 % Temporal Monitor --      BP Location FiO2 (%)     -- --       Physical Exam  Constitutional:       Appearance: Normal appearance.   HENT:      Head: Normocephalic.      Nose: Nose normal.      Mouth/Throat:      Mouth: Mucous membranes are moist.   Eyes:      Pupils: Pupils are equal, round, and reactive to light.   Cardiovascular:      Rate and Rhythm: Normal rate and regular rhythm.      Pulses: Normal pulses.      Heart sounds: Normal heart sounds.   Pulmonary:      Effort: Pulmonary effort is normal.      Breath sounds: Normal breath sounds.   Abdominal:      General: Abdomen is flat.      Palpations: Abdomen is soft.   Musculoskeletal:      Comments: Skin is cool and dry sensation is intact the patient is able to flex the knee and hip but he endorses severe pain when he does so.  Patient claims that the prosthetic does not fit appropriately however he indicates that he does have a follow-up regarding this.      Left Lower Extremity: Left leg is amputated below knee.   Neurological:      Mental Status: He is  alert.          DIAGNOSTIC RESULTS     LABS:  Labs Reviewed - No data to display    All other labs were within normal range or not returned as of this dictation.    Imaging  XR knee left 4+ views    (Results Pending)   XR hip left with pelvis when performed 2 or 3 views    (Results Pending)   XR shoulder left 2+ views    (Results Pending)        Procedures  Procedures     EMERGENCY DEPARTMENT COURSE/MDM:   Medical Decision Making  49-year-old male presents emergency department chief complaint knee pain.  Acute medical management treatment emergency department will consist of x-ray imaging of the lower extremity.  As well as the hip and shoulder.  Imaging shows-  Degenerative changes of the left AC joint, satisfactory appearance of bipolar left hip arthroplasty with no acute bony abnormality or dislocation, no evidence of acute osseous abnormality stable appearing left knee.  The patient is able to ambulate nursing staff indicates that he is tolerating p.o.  He is asking for pain medication we have agreed to send him home with a short-term and given him orthopedic and primary care provider follow-up.  Patient will be discharged home.    Diagnoses as of 12/24/24 8767   Acute pain of left knee        Patient and or family in agreement and understanding of treatment plan.  All questions answered.      I reviewed the case with the attending ED physician. The attending ED physician agrees with the plan. Patient and/or patient´s representative was counseled regarding labs, imaging, likely diagnosis, and plan. All questions were answered.    ED Medications administered this visit:    Medications   oxyCODONE-acetaminophen (Percocet) 5-325 mg per tablet 1 tablet (has no administration in time range)       New Prescriptions from this visit:    New Prescriptions    No medications on file       Follow-up:  No follow-up provider specified.      Final Impression: No diagnosis found.      (Please note that portions of this note  were completed with a voice recognition program.  Efforts were made to edit the dictations but occasionally words are mis-transcribed.)     Eric Pearson MD  Resident  12/24/24 0986

## 2024-12-24 NOTE — DISCHARGE INSTRUCTIONS
You were seen today for evaluation of knee pain.  We have provided you with an orthopedic referral here in this discharge paperwork as well as a primary care provider please follow-up with them for management of your blood pressure and your knee pain.  We have sent 3 days worth of Percocet to local pharmacy please pick them up and take them as indicated if you develop any new pain or worsening pain please return back to emergency department soon as possible.

## 2024-12-27 ENCOUNTER — HOSPITAL ENCOUNTER (EMERGENCY)
Facility: HOSPITAL | Age: 49
Discharge: LEFT BEFORE DISCHARGE | End: 2024-12-27
Attending: EMERGENCY MEDICINE
Payer: MEDICAID

## 2024-12-27 ENCOUNTER — APPOINTMENT (OUTPATIENT)
Dept: CARDIOLOGY | Facility: HOSPITAL | Age: 49
End: 2024-12-27
Payer: MEDICAID

## 2024-12-27 ENCOUNTER — APPOINTMENT (OUTPATIENT)
Dept: RADIOLOGY | Facility: HOSPITAL | Age: 49
End: 2024-12-27
Payer: MEDICAID

## 2024-12-27 VITALS
OXYGEN SATURATION: 100 % | HEART RATE: 72 BPM | HEIGHT: 70 IN | BODY MASS INDEX: 22.19 KG/M2 | RESPIRATION RATE: 20 BRPM | SYSTOLIC BLOOD PRESSURE: 151 MMHG | WEIGHT: 155 LBS | TEMPERATURE: 98.1 F | DIASTOLIC BLOOD PRESSURE: 93 MMHG

## 2024-12-27 DIAGNOSIS — R07.9 CHEST PAIN, UNSPECIFIED TYPE: Primary | ICD-10-CM

## 2024-12-27 LAB
ALBUMIN SERPL BCP-MCNC: 4.3 G/DL (ref 3.4–5)
ALP SERPL-CCNC: 123 U/L (ref 33–120)
ALT SERPL W P-5'-P-CCNC: 8 U/L (ref 10–52)
ANION GAP SERPL CALC-SCNC: 11 MMOL/L (ref 10–20)
AST SERPL W P-5'-P-CCNC: 10 U/L (ref 9–39)
BASOPHILS # BLD AUTO: 0.1 X10*3/UL (ref 0–0.1)
BASOPHILS NFR BLD AUTO: 1 %
BILIRUB SERPL-MCNC: 0.4 MG/DL (ref 0–1.2)
BNP SERPL-MCNC: 220 PG/ML (ref 0–99)
BUN SERPL-MCNC: 34 MG/DL (ref 6–23)
CALCIUM SERPL-MCNC: 8.9 MG/DL (ref 8.6–10.3)
CARDIAC TROPONIN I PNL SERPL HS: 11 NG/L (ref 0–20)
CARDIAC TROPONIN I PNL SERPL HS: 12 NG/L (ref 0–20)
CHLORIDE SERPL-SCNC: 106 MMOL/L (ref 98–107)
CO2 SERPL-SCNC: 24 MMOL/L (ref 21–32)
CREAT SERPL-MCNC: 1.91 MG/DL (ref 0.5–1.3)
EGFRCR SERPLBLD CKD-EPI 2021: 42 ML/MIN/1.73M*2
EOSINOPHIL # BLD AUTO: 0.1 X10*3/UL (ref 0–0.7)
EOSINOPHIL NFR BLD AUTO: 1 %
ERYTHROCYTE [DISTWIDTH] IN BLOOD BY AUTOMATED COUNT: 13.4 % (ref 11.5–14.5)
FLUAV RNA RESP QL NAA+PROBE: NOT DETECTED
FLUBV RNA RESP QL NAA+PROBE: NOT DETECTED
GLUCOSE SERPL-MCNC: 80 MG/DL (ref 74–99)
HCT VFR BLD AUTO: 40.4 % (ref 41–52)
HGB BLD-MCNC: 12.7 G/DL (ref 13.5–17.5)
IMM GRANULOCYTES # BLD AUTO: 0.02 X10*3/UL (ref 0–0.7)
IMM GRANULOCYTES NFR BLD AUTO: 0.2 % (ref 0–0.9)
INR PPP: 1.1 (ref 0.9–1.1)
LYMPHOCYTES # BLD AUTO: 2.45 X10*3/UL (ref 1.2–4.8)
LYMPHOCYTES NFR BLD AUTO: 25.6 %
MCH RBC QN AUTO: 32.1 PG (ref 26–34)
MCHC RBC AUTO-ENTMCNC: 31.4 G/DL (ref 32–36)
MCV RBC AUTO: 102 FL (ref 80–100)
MONOCYTES # BLD AUTO: 0.72 X10*3/UL (ref 0.1–1)
MONOCYTES NFR BLD AUTO: 7.5 %
NEUTROPHILS # BLD AUTO: 6.18 X10*3/UL (ref 1.2–7.7)
NEUTROPHILS NFR BLD AUTO: 64.7 %
NRBC BLD-RTO: 0 /100 WBCS (ref 0–0)
PLATELET # BLD AUTO: 227 X10*3/UL (ref 150–450)
POTASSIUM SERPL-SCNC: 4.3 MMOL/L (ref 3.5–5.3)
PROT SERPL-MCNC: 7.2 G/DL (ref 6.4–8.2)
PROTHROMBIN TIME: 11.9 SECONDS (ref 9.8–12.8)
RBC # BLD AUTO: 3.96 X10*6/UL (ref 4.5–5.9)
SARS-COV-2 RNA RESP QL NAA+PROBE: NOT DETECTED
SODIUM SERPL-SCNC: 137 MMOL/L (ref 136–145)
WBC # BLD AUTO: 9.6 X10*3/UL (ref 4.4–11.3)

## 2024-12-27 PROCEDURE — 93005 ELECTROCARDIOGRAM TRACING: CPT | Mod: 59

## 2024-12-27 PROCEDURE — 36415 COLL VENOUS BLD VENIPUNCTURE: CPT | Performed by: EMERGENCY MEDICINE

## 2024-12-27 PROCEDURE — 93005 ELECTROCARDIOGRAM TRACING: CPT

## 2024-12-27 PROCEDURE — 2500000001 HC RX 250 WO HCPCS SELF ADMINISTERED DRUGS (ALT 637 FOR MEDICARE OP)

## 2024-12-27 PROCEDURE — 85610 PROTHROMBIN TIME: CPT | Performed by: EMERGENCY MEDICINE

## 2024-12-27 PROCEDURE — 84484 ASSAY OF TROPONIN QUANT: CPT | Performed by: EMERGENCY MEDICINE

## 2024-12-27 PROCEDURE — 99285 EMERGENCY DEPT VISIT HI MDM: CPT | Performed by: EMERGENCY MEDICINE

## 2024-12-27 PROCEDURE — 71045 X-RAY EXAM CHEST 1 VIEW: CPT

## 2024-12-27 PROCEDURE — 80053 COMPREHEN METABOLIC PANEL: CPT | Performed by: EMERGENCY MEDICINE

## 2024-12-27 PROCEDURE — 87636 SARSCOV2 & INF A&B AMP PRB: CPT

## 2024-12-27 PROCEDURE — 85025 COMPLETE CBC W/AUTO DIFF WBC: CPT | Performed by: EMERGENCY MEDICINE

## 2024-12-27 PROCEDURE — 83880 ASSAY OF NATRIURETIC PEPTIDE: CPT | Performed by: EMERGENCY MEDICINE

## 2024-12-27 PROCEDURE — 71045 X-RAY EXAM CHEST 1 VIEW: CPT | Performed by: RADIOLOGY

## 2024-12-27 RX ORDER — AMLODIPINE BESYLATE 5 MG/1
2.5 TABLET ORAL ONCE
Status: COMPLETED | OUTPATIENT
Start: 2024-12-27 | End: 2024-12-27

## 2024-12-27 RX ORDER — CARVEDILOL 6.25 MG/1
12.5 TABLET ORAL ONCE
Status: COMPLETED | OUTPATIENT
Start: 2024-12-27 | End: 2024-12-27

## 2024-12-27 RX ORDER — LISINOPRIL 10 MG/1
10 TABLET ORAL ONCE
Status: COMPLETED | OUTPATIENT
Start: 2024-12-27 | End: 2024-12-27

## 2024-12-27 RX ORDER — NITROGLYCERIN 0.4 MG/1
0.4 TABLET SUBLINGUAL EVERY 5 MIN PRN
Status: DISCONTINUED | OUTPATIENT
Start: 2024-12-27 | End: 2024-12-27

## 2024-12-27 RX ADMIN — CARVEDILOL 12.5 MG: 6.25 TABLET, FILM COATED ORAL at 15:49

## 2024-12-27 RX ADMIN — AMLODIPINE BESYLATE 2.5 MG: 5 TABLET ORAL at 15:50

## 2024-12-27 RX ADMIN — LISINOPRIL 10 MG: 10 TABLET ORAL at 15:49

## 2024-12-27 RX ADMIN — NITROGLYCERIN 0.4 MG: 0.4 TABLET SUBLINGUAL at 15:48

## 2024-12-27 ASSESSMENT — COLUMBIA-SUICIDE SEVERITY RATING SCALE - C-SSRS
1. IN THE PAST MONTH, HAVE YOU WISHED YOU WERE DEAD OR WISHED YOU COULD GO TO SLEEP AND NOT WAKE UP?: NO
6. HAVE YOU EVER DONE ANYTHING, STARTED TO DO ANYTHING, OR PREPARED TO DO ANYTHING TO END YOUR LIFE?: NO
2. HAVE YOU ACTUALLY HAD ANY THOUGHTS OF KILLING YOURSELF?: NO

## 2024-12-27 ASSESSMENT — PAIN DESCRIPTION - DESCRIPTORS
DESCRIPTORS: PRESSURE
DESCRIPTORS: STABBING;SHARP;RADIATING
DESCRIPTORS: ACHING;DISCOMFORT;PRESSURE
DESCRIPTORS: PRESSURE

## 2024-12-27 ASSESSMENT — PAIN DESCRIPTION - LOCATION: LOCATION: CHEST

## 2024-12-27 ASSESSMENT — HEART SCORE
TROPONIN: LESS THAN OR EQUAL TO NORMAL LIMIT
AGE: 45-64
RISK FACTORS: >2 RISK FACTORS OR HX OF ATHEROSCLEROTIC DISEASE
HISTORY: SLIGHTLY SUSPICIOUS
HEART SCORE: 3
ECG: NORMAL

## 2024-12-27 ASSESSMENT — PAIN - FUNCTIONAL ASSESSMENT: PAIN_FUNCTIONAL_ASSESSMENT: 0-10

## 2024-12-27 ASSESSMENT — PAIN DESCRIPTION - ORIENTATION: ORIENTATION: MID

## 2024-12-27 ASSESSMENT — PAIN SCALES - GENERAL
PAINLEVEL_OUTOF10: 8
PAINLEVEL_OUTOF10: 7
PAINLEVEL_OUTOF10: 9

## 2024-12-27 ASSESSMENT — PAIN DESCRIPTION - FREQUENCY: FREQUENCY: CONSTANT/CONTINUOUS

## 2024-12-27 ASSESSMENT — LIFESTYLE VARIABLES
TOTAL SCORE: 0
EVER HAD A DRINK FIRST THING IN THE MORNING TO STEADY YOUR NERVES TO GET RID OF A HANGOVER: NO
EVER FELT BAD OR GUILTY ABOUT YOUR DRINKING: NO
HAVE YOU EVER FELT YOU SHOULD CUT DOWN ON YOUR DRINKING: NO
HAVE PEOPLE ANNOYED YOU BY CRITICIZING YOUR DRINKING: NO

## 2024-12-27 ASSESSMENT — PAIN DESCRIPTION - PAIN TYPE: TYPE: ACUTE PAIN

## 2024-12-27 ASSESSMENT — PAIN DESCRIPTION - PROGRESSION: CLINICAL_PROGRESSION: NOT CHANGED

## 2024-12-27 ASSESSMENT — PAIN DESCRIPTION - ONSET: ONSET: ONGOING

## 2024-12-27 NOTE — ED PROVIDER NOTES
Emergency Department Provider Note        History of Present Illness     History provided by: Patient and medical chart review  Limitations to History: None  External Records Reviewed with Brief Summary:  Multiple ER visits over the last month at least 9, records from [facility] as well as [facility].    HPI:  Alex Hall is a 49 y.o. male who is on his ninth emergency department visit this month has been seen priorly for chest pain at [facility] on 12/9/2024, additionally seen for chest pain on 12/10/2024 again at [facility].  Patient was subsequently seen for chest pain at [facility] on 12/11/2024.  He was reevaluated for chest pain on 12/12/2024 at [facility].  At each visit he did not have signs of ACS that would warrant admission to the hospital, consistently negative troponins negative chest x-rays.  Does have a history of polysubstance abuse and chronic chest pain.  Additionally the patient was seen at [facility] on 12/18/2024 and discharged, seen at this facility on 12/18/2024 and twice on 12/20/2024 for acute pain and left knee pain.  His most recent OARRS report shows not of average risk of unintentional overdose at 660.  He does have a history of atrial fibrillation and stenting, currently on Eliquis and Plavix, has history of hypertension currently on amlodipine and carvedilol, history of hyperlipidemia, patient is also prescribed Jardiance and has a current prescription for oxycodone.  Does have history of  BKA', and .  Patient states that he took all his antihypertensive medications prior to arrival however was unable to name all of his medications.  Additionally states he is still searching for a primary provider.  We will provide him with 1.    Physical Exam   Triage vitals:  T 36.7 °C (98.1 °F)  HR 91  BP (!) 163/98  RR 18  O2 100 % None (Room air)    General: Awake, alert, in no acute distress  Eyes: Gaze conjugate.  No scleral icterus or injection  HENT:  Normo-cephalic, atraumatic. No stridor  CV: Regular rate, regular rhythm. Radial pulses 2+ bilaterally  Resp: Breathing non-labored, speaking in full sentences.  Clear to auscultation bilaterally  GI: Soft, non-distended, non-tender. No rebound or guarding.  : Deferred  MSK/Extremities: No gross bony deformities. Moving all extremities, left hand and elbow contracted making fist patient states this is from a prior CVA  Skin: Warm. Appropriate color  Neuro: Alert. Oriented. Face symmetric. Speech is fluent.  Gross strength and sensation intact in b/l UE and LEs  Psych: Appropriate mood and affect    Medical Decision Making & ED Course   Medical Decision Makin y.o. male arrives with a chief complaint of substernal chest pressure and hypertension the patient has been seen multiple times at multiple facilities over the last month for similar and never required admission.  Cardiac workup was ordered including CBC, CMP, BNP, troponin with delta, chest x-ray, twelve-lead ECG, influenza flu swabs.  Patient given dose of nitroglycerin to help with chest pressure and hypertension.  After ED pharmacist review recent prescription fills was tensive since August number, he was given a dose of his home medications for treatment of hypertension.  Please see ED course for further medical decision making.  Patient states he already took 824 ASA prior to arrival  ----  Scoring Tools Utilized: HEART Score: 3       Differential diagnoses considered include but are not limited to: ACS, pain syndrome, CHF COPD, pneumonia     Social Determinants of Health which Significantly Impact Care: None identified     EKG Independent Interpretation: EKG interpreted by myself. Please see ED Course for full interpretation.    Independent Result Review and Interpretation: Relevant laboratory and radiographic results were reviewed and independently interpreted by myself.  As necessary, they are commented on in the ED Course.    Chronic conditions  affecting the patient's care: As documented above in MDM    The patient was discussed with the following consultants/services: None    Care Considerations: As documented above in [facility]    ED Course:  ED Course as of 12/27/24 1623   Fri Dec 27, 2024   1526 324 mg of aspirin were ordered for the patient.  Patient states that he already took his 324 aspirin prior to arrival.  Additionally he is now stating he sees a Dr. Montenegro through [facility]. [PV]   1526 Sublingual nitroglycerin ordered for chest pressure and hypertension [PV]   1532 Twelve-lead ECG is interpreted by me shows normal sinus rhythm at a ventricular rate of 78, , QTc 469, normal axis, no acute injury pattern, no high-grade AV farhat blocks. [PV]   1538 Per review by ER pharmacist he has not had refills on his medications since early November and August.  Patient states he took his daily meds however the only medication through pharmacy reveal that the patient has filled his Eliquis.  We will dose him with his home antihypertensives. [PV]   1620 Received signout from Dr. Harris pending second troponin level, BNP level, reevaluation. [AL]      ED Course User Index  [AL] Gregorio Montoya DO  [PV] Osvaldo Harris DO         Diagnoses as of 12/27/24 1623   Chest pain, unspecified type     Disposition   Patient was signed out to Dr. Montoya At 1700 pending completion of their work-up.  Please see the next provider's transition of care note for the remainder of the patient's care.     Procedures   Procedures    Patient seen and discussed with ED attending physician.    Osvaldo Harris DO  Emergency Medicine     Osvaldo Harris DO  Resident  12/27/24 1626

## 2024-12-27 NOTE — DISCHARGE INSTRUCTIONS
Return to the emergency department if you have any worsening your symptoms, questions or concerns, or change your mind regarding your evaluation.  Please follow-up with your cardiologist on 12/30/2024 as scheduled.

## 2024-12-28 LAB
ATRIAL RATE: 78 BPM
ATRIAL RATE: 89 BPM
P AXIS: 55 DEGREES
P AXIS: 70 DEGREES
P OFFSET: 203 MS
P OFFSET: 206 MS
P ONSET: 158 MS
P ONSET: 158 MS
PR INTERVAL: 130 MS
PR INTERVAL: 130 MS
Q ONSET: 223 MS
Q ONSET: 223 MS
QRS COUNT: 12 BEATS
QRS COUNT: 15 BEATS
QRS DURATION: 78 MS
QRS DURATION: 86 MS
QT INTERVAL: 390 MS
QT INTERVAL: 412 MS
QTC CALCULATION(BAZETT): 469 MS
QTC CALCULATION(BAZETT): 474 MS
QTC FREDERICIA: 444 MS
QTC FREDERICIA: 449 MS
R AXIS: 57 DEGREES
R AXIS: 70 DEGREES
T AXIS: 46 DEGREES
T AXIS: 87 DEGREES
T OFFSET: 418 MS
T OFFSET: 429 MS
VENTRICULAR RATE: 78 BPM
VENTRICULAR RATE: 89 BPM

## 2024-12-30 PROBLEM — I21.4 NSTEMI (NON-ST ELEVATED MYOCARDIAL INFARCTION) (MULTI): Status: ACTIVE | Noted: 2024-05-04

## 2024-12-30 PROBLEM — E11.51 TYPE 2 DIABETES MELLITUS WITH DIABETIC PERIPHERAL ANGIOPATHY WITHOUT GANGRENE, WITHOUT LONG-TERM CURRENT USE OF INSULIN (MULTI): Chronic | Status: ACTIVE | Noted: 2024-01-11

## 2024-12-30 PROBLEM — G54.6 PHANTOM LIMB SYNDROME WITH PAIN (MULTI): Status: ACTIVE | Noted: 2022-05-17

## 2024-12-30 PROBLEM — I82.419: Status: ACTIVE | Noted: 2018-10-03

## 2024-12-30 PROBLEM — B19.20 HEPATITIS C VIRUS INFECTION: Status: ACTIVE | Noted: 2022-12-30

## 2024-12-30 PROBLEM — K04.7 DENTAL INFECTION: Status: ACTIVE | Noted: 2024-07-11

## 2024-12-30 PROBLEM — F14.929: Status: ACTIVE | Noted: 2024-08-01

## 2024-12-30 PROBLEM — Z89.619 ABSENCE OF LOWER EXTREMITY (MULTI): Status: ACTIVE | Noted: 2024-12-30

## 2024-12-30 PROBLEM — Z89.512 HX OF BKA, LEFT (MULTI): Status: ACTIVE | Noted: 2024-01-11

## 2025-01-02 ENCOUNTER — APPOINTMENT (OUTPATIENT)
Dept: PRIMARY CARE | Facility: CLINIC | Age: 50
End: 2025-01-02
Payer: MEDICAID
